# Patient Record
Sex: FEMALE | Race: WHITE
[De-identification: names, ages, dates, MRNs, and addresses within clinical notes are randomized per-mention and may not be internally consistent; named-entity substitution may affect disease eponyms.]

---

## 2017-04-26 ENCOUNTER — HOSPITAL ENCOUNTER (INPATIENT)
Dept: HOSPITAL 74 - JER | Age: 67
LOS: 8 days | Discharge: HOME | DRG: 603 | End: 2017-05-04
Payer: COMMERCIAL

## 2017-04-26 VITALS — BODY MASS INDEX: 25.2 KG/M2

## 2017-04-26 DIAGNOSIS — Z87.891: ICD-10-CM

## 2017-04-26 DIAGNOSIS — I10: ICD-10-CM

## 2017-04-26 DIAGNOSIS — R50.9: ICD-10-CM

## 2017-04-26 DIAGNOSIS — I99.8: ICD-10-CM

## 2017-04-26 DIAGNOSIS — E78.5: ICD-10-CM

## 2017-04-26 DIAGNOSIS — L03.032: Primary | ICD-10-CM

## 2017-04-26 DIAGNOSIS — E03.9: ICD-10-CM

## 2017-04-26 DIAGNOSIS — D47.3: ICD-10-CM

## 2017-04-26 LAB
ALBUMIN SERPL-MCNC: 4 G/DL (ref 3.4–5)
ALP SERPL-CCNC: 99 U/L (ref 45–117)
ALT SERPL-CCNC: 22 U/L (ref 12–78)
ANION GAP SERPL CALC-SCNC: 9 MMOL/L (ref 8–16)
APTT BLD: 30.9 SECONDS (ref 26.9–34.4)
AST SERPL-CCNC: 20 U/L (ref 15–37)
BASOPHILS # BLD: 0.9 % (ref 0–2)
BILIRUB SERPL-MCNC: 0.6 MG/DL (ref 0.2–1)
CALCIUM SERPL-MCNC: 9.6 MG/DL (ref 8.5–10.1)
CO2 SERPL-SCNC: 26 MMOL/L (ref 21–32)
CREAT SERPL-MCNC: 0.7 MG/DL (ref 0.55–1.02)
DEPRECATED RDW RBC AUTO: 16.1 % (ref 11.6–15.6)
EOSINOPHIL # BLD: 3.5 % (ref 0–4.5)
GLUCOSE SERPL-MCNC: 106 MG/DL (ref 74–106)
INR BLD: 1.1 (ref 0.82–1.09)
MCH RBC QN AUTO: 26 PG (ref 25.7–33.7)
MCHC RBC AUTO-ENTMCNC: 32.8 G/DL (ref 32–36)
MCV RBC: 79.4 FL (ref 80–96)
NEUTROPHILS # BLD: 69.8 % (ref 42.8–82.8)
PLATELET # BLD AUTO: 873 K/MM3 (ref 134–434)
PMV BLD: 8.8 FL (ref 7.5–11.1)
PROT SERPL-MCNC: 6.8 G/DL (ref 6.4–8.2)
PT PNL PPP: 12.1 SEC (ref 9.98–11.88)
WBC # BLD AUTO: 16 K/MM3 (ref 4–10)

## 2017-04-26 RX ADMIN — CEFAZOLIN SCH MLS/HR: 1 INJECTION, POWDER, FOR SOLUTION INTRAVENOUS at 20:50

## 2017-04-26 RX ADMIN — ROSUVASTATIN CALCIUM SCH MG: 10 TABLET, FILM COATED ORAL at 21:31

## 2017-04-26 RX ADMIN — SODIUM CHLORIDE SCH MLS/HR: 9 INJECTION, SOLUTION INTRAVENOUS at 17:36

## 2017-04-26 RX ADMIN — HYDROXYUREA SCH MG: 500 CAPSULE ORAL at 21:31

## 2017-04-26 NOTE — CONSULT
Consult





- Alcohol/Substance Use


Hx Alcohol Use: No





- Smoking History


Smoking history: Former smoker


Have you smoked in the past 12 months: No


If you are a former smoker, when did you quit?: 





Home Medications





- Allergies


Allergies/Adverse Reactions: 


 Allergies











Allergy/AdvReac Type Severity Reaction Status Date / Time


 


No Known Allergies Allergy   Verified 17 09:33














- Home Medications


Home Medications: 


Ambulatory Orders





Levothyroxine [Synthroid -] 88 mcg PO DAILY 04/13/15 


Rosuvastatin Calcium [Crestor] 20 mg PO HS 04/13/15 


Tramadol HCl [Ultram -] 50 mg PO PRN PRN 04/13/15 


Aspirin [ASA -] 81 mg PO DAILY 17 


Hydroxyurea [Hydrea] 500 mg PO BID 17 


Nebivolol [Bystolic -] 5 mg PO DAILY 17 


Omeprazole 40 mg PO DAILY 17 











Physical Exam


Vital Signs: 


 Vital Signs











Temperature  98.6 F   17 14:12


 


Pulse Rate  69   17 14:12


 


Respiratory Rate  18   17 14:12


 


Blood Pressure  151/60   17 14:12


 


O2 Sat by Pulse Oximetry (%)  100   17 14:07














Assessment/Plan


Vascular Surgery 





The patient is a 66 year old female, with a significant past medical history of 

hypertension, hyperlipidemia,hypothyroidism, goiter s/p surgery, lower back pain

, migraines, Raynauds syndrome, and possible thrombocythemia, who presents to 

the emergency department sent by her PCP Dr. Sutherland, for evaluation of blue 

left pinky toe for approximately 5 days.The patient reports she has been 

experiencing pain to her left pinky toe for approximately 2 weeks. At first she 

believed the pain was associated with her shoes. As a result she reports using 

wider low heel shoes. She denies any trauma to the left pinky toe. Patient 

reports walking around on Saturday, which exacerbated her symptoms. After 

returning home, she reports experiencing left pinky toe swelling and sharp 

pinching sensations. Patient states she woke up in the middle of the night 

secondary to pain. Patient reports associated chills and sweats on . 

Patient states she visited her PCP earlier this week for evaluation of her toe 

pain, who reported she had poor circulation and placed her on hydroxyurea BID 

and 81 mg aspirin once a day. Patient reports subjective fever, productive cough

, and dizziness, but denies any abdominal pain, nausea, or vomiting. The 

patient denies any chest pain, shortness of breath, or palpitations. Patient 

reports cold-like symptoms approximately 1 week ago. 








Allergies: None reported.


Past Surgical History: Thyroidectomy, Fibroidectomy, Ectopic pregnancy, 

 section


Social History: Former smoker(Quit ). Denies alcohol or drug use. 


PCP: Dr. Koko SOLO


Head - NC/AT


Lung - CTA


Heart - RRR


abd - soft,nt,nd


ext - Left foot warm,pink, 


Palpable DP and PT pulse. 


Left fifth toe discolored. 





A/P 


Left fifth toe discoloration for 5 dAYS. Pt does not comment on any trauma. 


Pt has palpable pulses. No need for any vascular intervention at this time. 


Could this be raynauds?


Can treat with antibiotics. 


Will have to monitor toe to see how it progresses. 


Toe discoloration with pain could also be due to embolization from the aorta. 


Will follow





Ben Bishop DO

## 2017-04-26 NOTE — PDOC
History of Present Illness





<Wm Watson - Last Filed: 17 11:22>





- General


History Source: Patient


Exam Limitations: No Limitations





- History of Present Illness


Initial Comments: 


17 10:36


The patient is a 66 year old female, with a significant past medical history of 

hypertension, hyperlipidemia,hypothyroidism, goiter s/p surgery, lower back pain

, migraines, Raynauds syndrome, and possible thrombocythemia, who presents to 

the emergency department sent by her PCP Dr. Sutherland, for evaluation of blue 

left pinky toe for approximately 5 days.The patient reports she has been 

experiencing pain to her left pinky toe for approximately 2 weeks. At first she 

believed the pain was associated with her shoes. As a result she reports using 

wider low heel shoes. She denies any trauma to the left pinky toe. Patient 

reports walking around on Saturday, which exacerbated her symptoms. After 

returning home, she reports experiencing left pinky toe swelling and sharp 

pinching sensations. Patient states she woke up in the middle of the night 

secondary to pain. Patient reports associated chills and sweats on . 

Patient states she visited her PCP earlier this week for evaluation of her toe 

pain, who reported she had poor circulation and placed her on hydroxyurea BID 

and 81 mg aspirin once a day. Patient reports subjective fever, productive cough

, and dizziness, but denies any abdominal pain, nausea, or vomiting. The 

patient denies any chest pain, shortness of breath, or palpitations. Patient 

reports cold-like symptoms approximately 1 week ago. 








Allergies: None reported.


Past Surgical History: Thyroidectomy, Fibroidectomy, Ectopic pregnancy, 

 section


Social History: Former smoker(Quit ). Denies alcohol or drug use. 


PCP: Dr. Sutherland











<Vicenta Harrington - Last Filed: 17 15:46>





- General


Chief Complaint: Pain, Acute


Stated Complaint: PCP SENT


Time Seen by Provider: 17 09:38





Past History





- Past Medical History


Anemia:  (THROMBOCYTOPENIA)


Asthma: No


Cancer: No


Cardiac Disorders: No


CVA: No


COPD: No


CHF: No


Dementia: No


Diabetes: No


GI Disorders: Yes (H/O COLON POLYPS)


 Disorders: No


HTN: Yes


Hypercholesterolemia: Yes


Liver Disease: No


Seizures: No


Thyroid Disease: Yes


Other medical history: RAYNAUDS SYNDROME,MIGRAINES





- Surgical History


Abdominal Surgery: No


Appendectomy: No


Cardiac Surgery: No


Cholecystectomy: No


Lung Surgery: No


Neurologic Surgery: No


Orthopedic Surgery: No





- Psycho/Social/Smoking Cessation Hx


Suicidal Ideation: No


Smoking History: Former smoker


Have you smoked in the past 12 months: No


If you are a former smoker, when did you quit?: 


Information on smoking cessation initiated: No


Hx Alcohol Use: No


Drug/Substance Use Hx: No


Substance Use Type: None


Hx Substance Use Treatment: No





<Wm Watson - Last Filed: 17 11:22>





<Vicenta Harrington - Last Filed: 17 15:46>





- Past Medical History


Allergies/Adverse Reactions: 


 Allergies











Allergy/AdvReac Type Severity Reaction Status Date / Time


 


No Known Allergies Allergy   Verified 17 09:33











Home Medications: 


Ambulatory Orders





Levothyroxine [Synthroid -] 88 mcg PO DAILY 04/13/15 


Rosuvastatin Calcium [Crestor] 20 mg PO HS 04/13/15 


Tramadol HCl [Ultram -] 50 mg PO PRN PRN 04/13/15 


Aspirin [ASA -] 81 mg PO DAILY 17 


Hydroxyurea [Hydrea] 500 mg PO BID 17 


Nebivolol [Bystolic -] 5 mg PO DAILY 17 


Omeprazole 40 mg PO DAILY 17 











**Review of Systems





- Review of Systems


Able to Perform ROS?: Yes


Comments:: 





17 10:37


CONSTITUTIONAL:


Present: +Fever, +Chills, +Diaphoresis


Absent: Generalized Weakness, Malaise, Loss of Appetite


HEENT:


Present: +Nasal Congestion


Absent: Throat Pain, Throat Swelling, Difficulty Swallowing, Mouth Swelling, 

Ear Pain, Eye Pain, Visual Changes


CARDIOVASCULAR:


Absent: Chest Pain, Syncope, Palpitations, Irregular Heart Rate, Lightheadedness

, Peripheral Edema


RESPIRATORY:


Present: +Cough


Absent: Shortness of Breath, SOB with Exertion, Orthopnea, Wheezing, Stridor, 

Hemoptysis


GASTROINTESTINAL:


Absent: Abdominal pain, Abdominal Distension, Nausea, Vomiting, Diarrhea, 

Constipation, Melena, Hematochezia


GENITOURINARY:


Absent: Dysuria, Frequency, Urgency, Hesitancy, Flank Pain, Genital Pain


MUSCULOSKELETAL:


Present: +Swelling/ Erythema/ Blue Left pinky toe


Absent: Myalgia, Back pain, Neck Pain


SKIN:


Absent: Rash, Itching, Pallor


HEMEATOLOGIC/IMMUNOLOGIC:


Present: +poor circulation


Absent: Easy Bleeding, Easy Bruising, Lymphadenopathy, Frequent infections


ENDOCRINE:


Absent: Unexplained Weight Gain, Unexplained Weight Loss, Heat Intolerance, 

Cold Intolerance


NEUROLOGIC:


Present: +Dizziness


Absent: Headache, Focal Weakness, Vertigo, Unsteady Gait, Seizure, Mental 

Status Changes, Incontinence


PSYCHIATRIC:


Absent: Anxiety, Depression








<Vicenta Harrington - Last Filed: 17 15:46>





*Physical Exam





- Vital Signs


 Last Vital Signs











Temp Pulse Resp BP Pulse Ox


 


 98.6 F   71   16   148/72   99 


 


 17 09:28  17 09:28  17 09:28  17 09:28  17 09:28














<Wm Watson - Last Filed: 17 11:22>





- Vital Signs


 Last Vital Signs











Temp Pulse Resp BP Pulse Ox


 


 98.6 F   71   16   148/72   99 


 


 17 09:28  17 09:28  17 09:28  17 09:28  17 09:28














- Physical Exam


Comments: 


17 10:37





GENERAL: The patient is awake, alert, and fully oriented, in no acute distress.


HEAD: Normal with no signs of trauma.


EYES: Pupils equal, round and reactive to light, extraocular movements intact, 

sclera anicteric, conjunctiva clear.


ENT: Ears normal, nares patent, oropharynx clear without exudates. Moist mucous 

membranes.


NECK: Normal range of motion, supple without lymphadenopathy, JVD, or masses.


LUNGS: Breath sounds equal, clear to auscultation bilaterally. No wheezes, and 

no crackles.


HEART: Regular rate and rhythm, normal S1 and S2 without murmur, rub or gallop.


ABDOMEN: Soft, nontender, normoactive bowel sounds. No guarding, no rebound. No 

masses.


EXTREMITIES: 


Blue distally and erythematous proximal joint to the left 5th toe. Capillary 

refill 15 seconds in the left 5th toe. Toes are cold. Pain with dorsiflexion to 

the left 5th toe. Skin intact. Nail bed intact. Normal range of motion. No 

clubbing or cyanosis. No cords.


NEUROLOGICAL: Cranial nerves II through XII grossly intact. Normal speech, 

normal gait.


PSYCH: Normal mood, normal affect.


SKIN: Warm, Dry, normal turgor, no rashes or lesions noted.


CIRCULATION: Left leg femoral pulse 2+, DP 2+, PT 2+. Cap refill of toes 1-4 3 

seconds, 5th toe 15 seconds.








<Vicenta Harrington - Last Filed: 17 15:46>





**Heart Score/ECG Review





- ECG Intrepretation


Comment:: 





17 10:30


Twelve-lead EKG was reviewed by me.  The rhythm is normal sinus rhythm at a 

rate of 68 bpm.  The axis is normal.  The intervals are normal.  There is 

nonspecific T-wave flattening in the lateral leads.  Old EKG for comparison is 

pending at this time.





<Wm Watson - Last Filed: 17 11:22>





ED Treatment Course





- LABORATORY


CBC & Chemistry Diagram: 


 17 10:05





 17 10:05





- RADIOLOGY


Radiology Studies Ordered: 














 Category Date Time Status


 


 CHEST PA & LAT [RAD] [facility]  17 10:02 Ordered


 


 TOE(S) LEFT [RAD] Stat Radiology  17 10:14 Ordered














<Wm Watson - Last Filed: 17 11:22>





- LABORATORY


CBC & Chemistry Diagram: 


 17 10:05





 17 10:05





- RADIOLOGY


Radiograph Interpretation: 





17 15:43


EXAM: Toe X-Ray


INTERPRETED BY: Dr. Lackey


REVIEWED BY: Dr. Watson


IMPRESSION: Soft tissue changes are noted as above. No radiographic evidence of 

osteomyelitis is seen. MRI is more sensitive for detection of osteomyelitis. 





EXAM: CXR


INTERPRETED BY: Dr. Romeo


REVIEWED BY: Dr. Watson


IMPRESSION: No acute pathology. No significant change.








<Vicenta Harrington - Last Filed: 17 15:46>





Medical Decision Making





- Medical Decision Making





17 11:22


66-year-old female with a history of hypertension and hyperlipidemia, former 

smoker quit many years ago, presents with pain and discoloration in her left 

fifth toe for more than a week.  On examination, there is purplish 

discoloration and delayed capillary refill, up to 15 seconds capillary refill, 

but normal dorsalis pedis and posterior tibial pulses.  X-ray of the left fifth 

toe shows no fracture or dislocation, no bony changes.





Impression: 





Ischemic left fifth toe.  No atrial fibrillation to suggest embolic disease.  

No history of diabetes mellitus.  





Patient also has a history of thrombocytosis.  She was recently started on 

hydroxyurea and baby aspirin.  





Patient will be admitted for further evaluation of her ischemic toe.  There is 

no evidence of open wound to the area.  White blood cell count is elevated, but 

patient is afebrile.





 Laboratory Tests











  17





  10:05


 


WBC  16.0 H D


 


RBC  5.33 H


 


Hgb  13.9


 


Hct  42.3


 


MCV  79.4 L


 


MCHC  32.8


 


RDW  16.1 H


 


Plt Count  873 H D


 


MPV  8.8


 


Neutrophils %  69.8


 


Lymphocytes %  18.1  D


 


Monocytes %  7.7


 


Eosinophils %  3.5


 


Basophils %  0.9








Other lab results are pending at this time.





<Wm Watson - Last Filed: 17 11:22>





- Medical Decision Making


17 11:41


First call placed to Dr. Sutherland at 11:41. Case discussed at this time. Agreed 

to admit.


Will put in a consult to Dr. Bishop.





<Vicenta Harrington - Last Filed: 17 15:46>





*DC/Admit/Observation/Transfer





- Discharge Dispostion


Admit: Yes


Decision to Admit order Date/Time: 





17 11:26








<Wm Watson - Last Filed: 17 11:22>





- Attestations


Scribe Attestion: 





17 10:37





Documentation prepared by Vicenta Harrington, acting as medical scribe for Wm Watson MD.





<Vicenta Harrington - Last Filed: 17 15:46>


Diagnosis at time of Disposition: 


 Ischemic toe, Thrombocytosis





- Discharge Dispostion


Condition at time of disposition: Stable





- Referrals

## 2017-04-27 LAB
ALBUMIN SERPL-MCNC: 3.3 G/DL (ref 3.4–5)
ALP SERPL-CCNC: 88 U/L (ref 45–117)
ALT SERPL-CCNC: 18 U/L (ref 12–78)
ANION GAP SERPL CALC-SCNC: 7 MMOL/L (ref 8–16)
AST SERPL-CCNC: 17 U/L (ref 15–37)
BASOPHILS # BLD: 1.5 % (ref 0–2)
BILIRUB DIRECT SERPL-MCNC: 207 U/L (ref 84–246)
BILIRUB SERPL-MCNC: 0.7 MG/DL (ref 0.2–1)
CALCIUM SERPL-MCNC: 8.9 MG/DL (ref 8.5–10.1)
CO2 SERPL-SCNC: 28 MMOL/L (ref 21–32)
COCKROFT - GAULT: 74.84
CREAT SERPL-MCNC: 0.8 MG/DL (ref 0.55–1.02)
CRP SERPL-MCNC: 1.4 MG/DL (ref 0–0.3)
DEPRECATED RDW RBC AUTO: 15.5 % (ref 11.6–15.6)
EOSINOPHIL # BLD: 4.5 % (ref 0–4.5)
FERRITIN SERPL-MCNC: 51.41 NG/ML (ref 6.9–282.5)
GLUCOSE SERPL-MCNC: 109 MG/DL (ref 74–106)
MCH RBC QN AUTO: 25.9 PG (ref 25.7–33.7)
MCHC RBC AUTO-ENTMCNC: 32.9 G/DL (ref 32–36)
MCV RBC: 78.7 FL (ref 80–96)
NEUTROPHILS # BLD: 69.5 % (ref 42.8–82.8)
PLATELET # BLD AUTO: 859 K/MM3 (ref 134–434)
PMV BLD: 8.6 FL (ref 7.5–11.1)
PROT SERPL-MCNC: 6 G/DL (ref 6.4–8.2)
URATE SERPL-SCNC: 5.7 MG/DL (ref 2.6–7.2)
WBC # BLD AUTO: 16.2 K/MM3 (ref 4–10)

## 2017-04-27 RX ADMIN — CEFAZOLIN SCH MLS/HR: 1 INJECTION, POWDER, FOR SOLUTION INTRAVENOUS at 10:15

## 2017-04-27 RX ADMIN — CEFAZOLIN SCH MLS/HR: 1 INJECTION, POWDER, FOR SOLUTION INTRAVENOUS at 01:25

## 2017-04-27 RX ADMIN — ROSUVASTATIN CALCIUM SCH MG: 10 TABLET, FILM COATED ORAL at 21:39

## 2017-04-27 RX ADMIN — NYSTATIN AND TRIAMCINOLONE ACETONIDE SCH APPLIC: 100000; 1 OINTMENT TOPICAL at 13:52

## 2017-04-27 RX ADMIN — PANTOPRAZOLE SODIUM SCH MG: 40 TABLET, DELAYED RELEASE ORAL at 10:15

## 2017-04-27 RX ADMIN — LEVOTHYROXINE SODIUM SCH MCG: 88 TABLET ORAL at 07:26

## 2017-04-27 RX ADMIN — SODIUM CHLORIDE SCH MLS/HR: 9 INJECTION, SOLUTION INTRAVENOUS at 13:34

## 2017-04-27 RX ADMIN — NYSTATIN AND TRIAMCINOLONE ACETONIDE SCH APPLIC: 100000; 1 OINTMENT TOPICAL at 21:39

## 2017-04-27 RX ADMIN — NEBIVOLOL HYDROCHLORIDE SCH MG: 5 TABLET ORAL at 10:15

## 2017-04-27 RX ADMIN — HYDROXYUREA SCH MG: 500 CAPSULE ORAL at 10:15

## 2017-04-27 RX ADMIN — ASPIRIN 81 MG SCH MG: 81 TABLET ORAL at 10:15

## 2017-04-27 RX ADMIN — CEFAZOLIN SCH MLS/HR: 1 INJECTION, POWDER, FOR SOLUTION INTRAVENOUS at 18:29

## 2017-04-27 RX ADMIN — HYDROXYUREA SCH MG: 500 CAPSULE ORAL at 21:38

## 2017-04-27 RX ADMIN — SODIUM CHLORIDE SCH MLS/HR: 9 INJECTION, SOLUTION INTRAVENOUS at 04:28

## 2017-04-27 NOTE — HP
Admitting History and Physical





- Primary Care Physician


PCP: Pranav Horner





- Admission


Chief Complaint: left small toe discoloration


History of Present Illness: 


ER HISTORY 


- History of Present Illness


Initial Comments: 


04/26/17 10:36


The patient is a 66 year old female, with a significant past medical history of 

hypertension, hyperlipidemia,hypothyroidism, goiter s/p surgery, lower back pain

, migraines, Raynauds syndrome, and possible thrombocythemia, who presents to 

the emergency department sent by her PCP Dr. Horner, for evaluation of blue 

left pinky toe for approximately 5 days.The patient reports she has been 

experiencing pain to her left pinky toe for approximately 2 weeks. At first she 

believed the pain was associated with her shoes. As a result she reports using 

wider low heel shoes. She denies any trauma to the left pinky toe. Patient 

reports walking around on Saturday, which exacerbated her symptoms. After 

returning home, she reports experiencing left pinky toe swelling and sharp 

pinching sensations. Patient states she woke up in the middle of the night 

secondary to pain. Patient reports associated chills and sweats on Sunday. 

Patient states she visited her PCP earlier this week for evaluation of her toe 

pain, who reported she had poor circulation and placed her on hydroxyurea BID 

and 81 mg aspirin once a day. Patient reports subjective fever, productive cough

, and dizziness, but denies any abdominal pain, nausea, or vomiting. The 

patient denies any chest pain, shortness of breath, or palpitations. Patient 

reports cold-like symptoms approximately 1 week ago. 








Pt examined by me in the floors


Family at bedside


Has h/o Thrombocythemia-- seen by Dr horner on 4/25 for blue left 5 th toe and 

was advised t go to ER at that time, but refused to go. 


She states that the bluish color has gotten better. Has pain at underside of 

toe. Able to move toes. 


She c/o dysphagia for many years now- she feels food stuck in her chest about 

20 min after eating food. She had EGD done before which was normal per pt. 


She is able to ambulate without any difficulty 








History Source: Patient


Limitations to Obtaining History: No Limitations





- Past Medical History


Cardiovascular: Yes: HTN, Hyperlipdemia


Heme/Onc: Yes: Other (thrombocythemia)


Rheumatology: Yes: Other (Reynaud's disease)


Endocrine: Yes: Hypothyroidism





- Smoking History


Smoking history: Former smoker


Have you smoked in the past 12 months: No


If you are a former smoker, when did you quit?: 1990





- Alcohol/Substance Use


Hx Alcohol Use: No





Home Medications





- Allergies


Allergies/Adverse Reactions: 


 Allergies











Allergy/AdvReac Type Severity Reaction Status Date / Time


 


No Known Allergies Allergy   Verified 04/26/17 09:33














- Home Medications


Home Medications: 


Ambulatory Orders





Levothyroxine [Synthroid -] 88 mcg PO DAILY 04/13/15 


Rosuvastatin Calcium [Crestor] 20 mg PO HS 04/13/15 


Tramadol HCl [Ultram -] 50 mg PO PRN PRN 04/13/15 


Aspirin [ASA -] 81 mg PO DAILY 04/26/17 


Hydroxyurea [Hydrea] 500 mg PO BID 04/26/17 


Nebivolol [Bystolic -] 5 mg PO DAILY 04/26/17 


Omeprazole 40 mg PO DAILY 04/26/17 











Review of Systems





- Review of Systems


Constitutional: denies: Chills, Fever, Loss of Appetite, Malaise, Unintentional 

Wgt. Loss, Weakness


Musculoskeletal: reports: Other (toe pain)





Physical Examination


Vital Signs: 


 Vital Signs











Temperature  99 F   04/27/17 06:00


 


Pulse Rate  60   04/27/17 06:00


 


Respiratory Rate  20   04/27/17 06:00


 


Blood Pressure  126/64   04/27/17 06:00


 


O2 Sat by Pulse Oximetry (%)  100   04/26/17 21:00











Constitutional: Yes: No Distress, Calm


Cardiovascular: Yes: Regular Rate and Rhythm


Respiratory: Yes: CTA Bilaterally


Gastrointestinal: Yes: Normal Bowel Sounds, Soft.  No: Distention, Tenderness


Extremities: Yes: Other (left 5th toe-- slight bluish in color, pulses palpable

, foot is warm, intertrigo)


Edema: No


Psychiatric: Yes: Alert, Oriented


Labs: 


 Laboratory Last Values











WBC  16.2 K/mm3 (4.0-10.0)  H  04/27/17  10:30    


 


RBC  4.92 M/mm3 (3.60-5.2)   04/27/17  10:30    


 


Hgb  12.7 GM/dL (10.7-15.3)   04/27/17  10:30    


 


Hct  38.7 % (32.4-45.2)   04/27/17  10:30    


 


MCV  78.7 fl (80-96)  L  04/27/17  10:30    


 


MCHC  32.9 g/dl (32.0-36.0)   04/27/17  10:30    


 


RDW  15.5 % (11.6-15.6)   04/27/17  10:30    


 


Plt Count  859 K/MM3 (134-434)  H  04/27/17  10:30    


 


MPV  8.6 fl (7.5-11.1)   04/27/17  10:30    


 


Neutrophils %  69.5 % (42.8-82.8)   04/27/17  10:30    


 


Lymphocytes %  18.3 % (8-40)   04/27/17  10:30    


 


Monocytes %  6.2 % (3.8-10.2)   04/27/17  10:30    


 


Eosinophils %  4.5 % (0-4.5)   04/27/17  10:30    


 


Basophils %  1.5 % (0-2.0)   04/27/17  10:30    


 


ESR  10 mm/hr (0-30)   04/27/17  10:32    


 


INR  1.10  (0.82-1.09)   04/26/17  10:05    


 


PTT (Actin FS)  30.9 SECONDS (26.9-34.4)   04/26/17  10:05    


 


Sodium  146 mmol/L (136-145)  H  04/27/17  10:30    


 


Potassium  4.5 mmol/L (3.5-5.1)   04/27/17  10:30    


 


Chloride  111 mmol/L ()  H  04/27/17  10:30    


 


Carbon Dioxide  28 mmol/L (21-32)   04/27/17  10:30    


 


Anion Gap  7  (8-16)  L  04/27/17  10:30    


 


BUN  13 mg/dL (7-18)   04/27/17  10:30    


 


Creatinine  0.8 mg/dL (0.55-1.02)   04/27/17  10:30    


 


Creat Clearance w eGFR  > 60  (>60)   04/27/17  10:30    


 


Random Glucose  109 mg/dL ()  H  04/27/17  10:30    


 


Lactic Acid  1.056 mmol/L (0.4-2.0)   04/26/17  10:50    


 


Uric Acid  5.7 mg/dL (2.6-7.2)   04/27/17  10:30    


 


Calcium  8.9 mg/dL (8.5-10.1)   04/27/17  10:30    


 


Ferritin  51.415 ng/ml (6.9-282.5)   04/27/17  10:30    


 


Total Bilirubin  0.7 mg/dL (0.2-1.0)   04/27/17  10:30    


 


AST  17 U/L (15-37)   04/27/17  10:30    


 


ALT  18 U/L (12-78)   04/27/17  10:30    


 


Alkaline Phosphatase  88 U/L ()   04/27/17  10:30    


 


LD Total  207 U/L ()   04/27/17  10:30    


 


C-Reactive Protein  1.4 MG/DL (0.00-0.3)  H  04/27/17  10:30    


 


Total Protein  6.0 g/dl (6.4-8.2)  L  04/27/17  10:30    


 


Albumin  3.3 g/dl (3.4-5.0)  L  04/27/17  10:30    


 


Blood Type  O POSITIVE   04/26/17  10:05    


 


Antibody Screen  Negative   04/26/17  10:05    














Imaging





- Results


Chest X-ray: Image Reviewed (clear)


X-ray: Report Reviewed (toe xray-- soft tissue swelling)


EKG: Image Reviewed (sinus)





Problem List





- Problems


(1) Ischemic toe


Assessment/Plan: 


unlikely ischemic isael as pt has warm extremities and good peripheral pulses


Seen by Vascular 


Code(s): I99.8 - OTHER DISORDER OF CIRCULATORY SYSTEM





(2) Thrombocytosis


Assessment/Plan: 


Hematology evaluation


On ASA and Hydroxyurea 


Code(s): D47.3 - ESSENTIAL (HEMORRHAGIC) THROMBOCYTHEMIA





(3) Cellulitis


Assessment/Plan: 


iv antibiotics 


Code(s): L03.90 - CELLULITIS, UNSPECIFIED   Qualifiers: 


     Site of cellulitis of extremity: toe     Laterality: left





(4) HTN (hypertension)


Assessment/Plan: 


on meds


monitor BP 


Code(s): I10 - ESSENTIAL (PRIMARY) HYPERTENSION   Qualifiers: 


     Hypertension type: essential hypertension        Qualified Code(s): I10 - 

Essential (primary) hypertension

## 2017-04-27 NOTE — CONSULT
Consult - text type





- Consultation


Consultation Note: 


Patient seen and examined





The patient is a 66 year old female, with a significant past medical history of 

hypertension, hyperlipidemia,hypothyroidism, goiter s/p surgery, lower back pain

, migraines, Raynauds syndrome, and possible thrombocythemia, who presents  

for evaluation of blue left pinky toe for approximately 2 weeks.The patient 

reports she has been experiencing pain to her left pinky toe for approximately 

2 weeks. At first she believed the pain was associated with her shoes. As a 

result she reports using wider low heel shoes. She denies any trauma to the 

left pinky toe. It has been worse for last 5 days. Patient states she woke up 

in the middle of the night secondary to pain. Patient reports associated chills 

and sweats on . Patient states she visited her PCP earlier this week for 

evaluation of her toe pain, who reported she had poor circulation and placed 

her on hydroxyurea BID and 81 mg aspirin once a day.  The patient denies any 

chest pain, shortness of breath, or palpitations. Patient reports cold-like 

symptoms approximately 1 week ago. 


she denies 











- Past Medical History


Cardiovascular: Yes: HTN, Hyperlipdemia


Heme/Onc: Yes: Other (thrombocythemia)


Rheumatology: Yes: Other (Reynaud's disease)


Endocrine: Yes: Hypothyroidism





PSH





myomectomy


fx repair


carpal tunnel repair


thyroid surgery





- Smoking History


Smoking history: Former smoker











 Home Medications 





- Allergies


Allergies/Adverse Reactions: 


 Allergies











Allergy/AdvReac Type Severity Reaction Status Date / Time


 


No Known Allergies Allergy   Verified 17 09:33














- Home Medications


Home Medications: 


Ambulatory Orders





Levothyroxine [Synthroid -] 88 mcg PO DAILY 04/13/15 


Rosuvastatin Calcium [Crestor] 20 mg PO HS 04/13/15 


Tramadol HCl [Ultram -] 50 mg PO PRN PRN 04/13/15 


Aspirin [ASA -] 81 mg PO DAILY 17 


Hydroxyurea [Hydrea] 500 mg PO BID 17 


Nebivolol [Bystolic -] 5 mg PO DAILY 17 


Omeprazole 40 mg PO DAILY 17 











 Review of Systems 





- Review of Systems


Constitutional: denies: Chills, Fever, Loss of Appetite, Malaise, Unintentional 

Wgt. Loss, Weakness


Musculoskeletal: reports: Other (toe pain)





 Physical Examination 


Vital Signs: 


 Vital Signs











Temperature  99 F   17 06:00


 


Pulse Rate  60   17 06:00


 


Respiratory Rate  20   17 06:00


 


Blood Pressure  126/64   17 06:00


 


O2 Sat by Pulse Oximetry (%)  100   17 21:00











Constitutional: Yes: No Distress, Calm


Cardiovascular: Yes: Regular Rate and Rhythm


Respiratory: Yes: CTA Bilaterally


Gastrointestinal: Yes: Normal Bowel Sounds, Soft.  No: Distention, Tenderness


Extremities: Yes: Other (left 5th toe-- slight bluish in color, pulses palpable

, foot is warm, intertrigo)


Edema: No


Psychiatric: Yes: Alert, Oriented


Labs: 


 Laboratory Last Values











WBC  16.2 K/mm3 (4.0-10.0)  H  17  10:30    


 


RBC  4.92 M/mm3 (3.60-5.2)   17  10:30    


 


Hgb  12.7 GM/dL (10.7-15.3)   17  10:30    


 


Hct  38.7 % (32.4-45.2)   17  10:30    


 


MCV  78.7 fl (80-96)  L  17  10:30    


 


MCHC  32.9 g/dl (32.0-36.0)   17  10:30    


 


RDW  15.5 % (11.6-15.6)   17  10:30    


 


Plt Count  859 K/MM3 (134-434)  H  17  10:30    


 


MPV  8.6 fl (7.5-11.1)   17  10:30    


 


Neutrophils %  69.5 % (42.8-82.8)   17  10:30    


 


Lymphocytes %  18.3 % (8-40)   17  10:30    


 


Monocytes %  6.2 % (3.8-10.2)   17  10:30    


 


Eosinophils %  4.5 % (0-4.5)   17  10:30    


 


Basophils %  1.5 % (0-2.0)   17  10:30    


 


ESR  10 mm/hr (0-30)   17  10:32    


 


INR  1.10  (0.82-1.09)   17  10:05    


 


PTT (Actin FS)  30.9 SECONDS (26.9-34.4)   17  10:05    


 


Sodium  146 mmol/L (136-145)  H  17  10:30    


 


Potassium  4.5 mmol/L (3.5-5.1)   17  10:30    


 


Chloride  111 mmol/L ()  H  17  10:30    


 


Carbon Dioxide  28 mmol/L (21-32)   17  10:30    


 


Anion Gap  7  (8-16)  L  17  10:30    


 


BUN  13 mg/dL (7-18)   17  10:30    


 


Creatinine  0.8 mg/dL (0.55-1.02)   17  10:30    


 


Creat Clearance w eGFR  > 60  (>60)   17  10:30    


 


Random Glucose  109 mg/dL ()  H  17  10:30    


 


Lactic Acid  1.056 mmol/L (0.4-2.0)   17  10:50    


 


Uric Acid  5.7 mg/dL (2.6-7.2)   17  10:30    


 


Calcium  8.9 mg/dL (8.5-10.1)   17  10:30    


 


Ferritin  51.415 ng/ml (6.9-282.5)   17  10:30    


 


Total Bilirubin  0.7 mg/dL (0.2-1.0)   17  10:30    


 


AST  17 U/L (15-37)   17  10:30    


 


ALT  18 U/L (12-78)   17  10:30    


 


Alkaline Phosphatase  88 U/L ()   17  10:30    


 


LD Total  207 U/L ()   17  10:30    


 


C-Reactive Protein  1.4 MG/DL (0.00-0.3)  H  17  10:30    


 


Total Protein  6.0 g/dl (6.4-8.2)  L  17  10:30    


 


Albumin  3.3 g/dl (3.4-5.0)  L  17  10:30    


 


Blood Type  O POSITIVE   17  10:05    


 


Antibody Screen  Negative   17  10:05    











A/P





65 y/o with h/o autoimmune disease, comes in with Lt. 5th toe bluish 

discoloration and pain for 2 weeks, worsening since 4 days. Increase pain on 

elevation and improvement on hanging the legs down.


Also platelets  WBC elevated.


No other signs and symptoms suggestive of reactive thrombocytosis


Suspect primary myeloproliferative disorder with micro vascular ischemia


Peripheral blood flow/JAK2/CALr/MPL sent


VWD w/u sent


Will await above studies





Agree with hydrea to control platelet counts


agree with asa 





discussed at length with patient and her family

## 2017-04-27 NOTE — CONSULT
Admitting History and Physical





- Primary Care Physician


PCP: Beata Lauren





- Admission


History of Present Illness: 


Pt "c/o dysphagia for many years now- she feels food stuck in her chest about 

20 min after eating food. She had EGD done before which was normal per pt"





Pt reports c/o dysphagia with solid, food sticking in chest, followed by 

inability to eat or drink, 3 times a month. Denies vomiting or expectorating a 

lot of saliva at the time. She needs to stand up and walk around to clear it.


She had an EGD a couple of years ago by Dr. Larson. She is now followed by Dr. Solorzano.She reports frequent heartburn, "even on oatmeal".


History Source: Patient, Family Member, Medical Record


Limitations to Obtaining History: No Limitations





- Past Medical History


Cardiovascular: Yes: HTN, Hyperlipdemia


Heme/Onc: Yes: Other (thrombocythemia)


Rheumatology: Yes: Other (Reynaud's disease)


Endocrine: Yes: Hypothyroidism





- Smoking History


Smoking history: Former smoker


Have you smoked in the past 12 months: No


If you are a former smoker, when did you quit?: 1990





- Alcohol/Substance Use


Hx Alcohol Use: No





History





- Admission


Reason For Visit: ISCHEMIA OF TOE





- Diagnostics


X-ray: Report Reviewed





- General


Mental Status: Alert and Oriented, Awake and Alert, Able to Follow Commands


Attention: Intact


Ability to Follow Directions: Excellent


Head/Neck Control: WFL





- Hearing


Hearing: Normal





Speech Evaluation





- Communication


Primary Language: ENGLISH


Communication: Yes: Within Normal Limits


Oral Expression Ability: Yes: No Impairment





- Speech Production


Able to Make Needs Known: Yes: WNL


Intelligibility: Yes: WNL





- Speech Characteristics


Voice Loudness: Normal


Voice Pitch: Yes: Normal


Voice Phonatory-based Quality: Yes: Normal


Speech Pattern: Normal


Speech Clarity: < 100%


Nasal Resonance: Normal


Rate of Speech: Intact





- Language/Auditory Comprehension


Follows: Yes: Complex Commands





- Language/Verbal Expression


Functional Communication Status: Yes: WNL





- Memory/Perception


Long term Memory: Yes: WNL


Short Term Memory: Yes: WNL





- Swallow Evaluation/Bedside Assessment


Current Nutritional Intake: Regular, Thin Liquids


Oral Secretions: Yes: WFL


Dentition: Yes: Adequate


Facial Symmetry at Rest: Symmetrical


Facial Symmetry on Retraction: Symmetrical


Facial Movement: Controlled


Against Resistance Opening: Normal


Against Resistance Closing: Normal


Pucker Lips: Normal


Smile: Normal


Lingual Movement: Normal, Symmetric


Lingual Speed of Movement: Normal


Lingual Movement Strgth Against Opposition: Normal


Lingual Movement Characteristics: Normal


Velopharyngeal Movement: Normal


Laryngeal Elevation: WFL


Laryngeal Movement: Able to Palpate


Bolus Size: WFL


Labial Seal: WFL


Chewing: WFL


Oral Prep Time: WFL


A-P Transit: WFL


Pocketing: None


Timing of Swallow: WFL


Coughing/Throat Clear: No


Change in Voice: No





Recommendations





- Speech Evaluation, Impression/Plan


Impression: Pt with c/o dysphagia on solids, frequent heartburn, using eating 

an "entire sandwich before drinking." Solids stick in chest, with improved 

passage while standing.  R/o esophageal dysphagia, r/o GERD/gastritis, less 

likely stricture/schatzki's ring.  Pt denies intake of caffiene, carbonation, 

citrus, ETOH,smoking and is upright after meals.





- Dysphagia Impressions/Plan


Swallowing Skills: Impaired


Dysphagia Impressions: Ongoing Evaluation


Recommendations: GI Consult (Dr. Solorzano), MBS w Esophagus (ordered by pmd), 

Other (Alternate solids with liquids.)





- Recommendations


Diet Consistency: Regular (soft, moist, extra gravy/condiments), Other (GERD 

precautions)


Medication Administration: Whole with water (denies difficulty with pills)


Liquids: Thin Liquids

## 2017-04-28 LAB
IRON SERPL-MCNC: 60 UG/DL (ref 27–139)
TIBC SERPL-MCNC: 295 UG/DL (ref 250–450)
UIBC SERPL-MCNC: 235 UG/DL (ref 118–369)

## 2017-04-28 RX ADMIN — LEVOTHYROXINE SODIUM SCH MCG: 88 TABLET ORAL at 06:40

## 2017-04-28 RX ADMIN — ROSUVASTATIN CALCIUM SCH MG: 10 TABLET, FILM COATED ORAL at 21:31

## 2017-04-28 RX ADMIN — ASPIRIN 81 MG SCH MG: 81 TABLET ORAL at 21:31

## 2017-04-28 RX ADMIN — CEFAZOLIN SCH MLS/HR: 1 INJECTION, POWDER, FOR SOLUTION INTRAVENOUS at 17:06

## 2017-04-28 RX ADMIN — SODIUM CHLORIDE SCH MLS/HR: 9 INJECTION, SOLUTION INTRAVENOUS at 10:04

## 2017-04-28 RX ADMIN — NYSTATIN AND TRIAMCINOLONE ACETONIDE SCH APPLIC: 100000; 1 OINTMENT TOPICAL at 21:31

## 2017-04-28 RX ADMIN — CEFAZOLIN SCH MLS/HR: 1 INJECTION, POWDER, FOR SOLUTION INTRAVENOUS at 09:36

## 2017-04-28 RX ADMIN — NYSTATIN AND TRIAMCINOLONE ACETONIDE SCH APPLIC: 100000; 1 OINTMENT TOPICAL at 10:04

## 2017-04-28 RX ADMIN — NEBIVOLOL HYDROCHLORIDE SCH MG: 5 TABLET ORAL at 09:35

## 2017-04-28 RX ADMIN — ASPIRIN 81 MG SCH MG: 81 TABLET ORAL at 09:34

## 2017-04-28 RX ADMIN — PANTOPRAZOLE SODIUM SCH MG: 40 TABLET, DELAYED RELEASE ORAL at 09:34

## 2017-04-28 RX ADMIN — HYDROXYUREA SCH MG: 500 CAPSULE ORAL at 21:31

## 2017-04-28 RX ADMIN — CEFAZOLIN SCH MLS/HR: 1 INJECTION, POWDER, FOR SOLUTION INTRAVENOUS at 02:52

## 2017-04-28 NOTE — PATH
Surgical Pathology Report



Patient Name:  GAL ROSE

Accession #:  [id number]

[facility]. Rec. #:  [id number]                                                        

   /Age/Gender:  1950 (Age: 66) / F

Account:  [id number]                                                          

             Location: [facility] MED/SURG

Taken:  2017

Received:  2017

Reported:  2017

Physicians:  Grecia Mitchell M.D.

  



Specimen(s) Received

 PERIPHERAL BLOOD 





Clinical History

None Provided







Final Diagnosis

PERIPHERAL BLOOD:

FLOW CYTOMETRY performed and interpreted [place] [facility] [place] [place]

([id number]) shows the following:

INTERPRETATION: No atypical flow cytometric findings.

PHENOTYPE: Lymphocytes include polyclonal B cells, NK cells and

immunophenotypically normal CD4+ and CD8+ T cells in normal proportions. No

evidence of a clonal lymphoid expansion. Granulocytes are immunophenotypically

mature.



Comment: Molecular studies are pending, and a report will follow.





***Electronically Signed***

Satya Hurtado M.D.



Addendum     

Reported: 2017



Addendum Diagnosis

JAK2 V617F MUTATION ANALYSIS BY PCR PERFORMED AND INTERPRETED AT [facility]

[place] ([id number]) SHOWED THE FOLLOWING:

     RESULTS:  BOTH MUTANT AND WILD-TYPE JAK2 SEQUENCES WERE DETECTED.

     INTERPRETATION:  POSITIVE FOR JAK2 V617F MUTATION.



Comment: The acquired somatic V617F JAK2 mutation has been found in a majority

of patients with Polycythemia Vera (PV)(>90%), and 30-50% of patients with

either essential thrombocythemia (ET) and or myelofibrosis (MF) according to

literature. The mutation is not detected in normal individuals. 





 Alexander Finkelstein, M.D.  

Addendum     

Reported: 2017



Addendum Diagnosis

Molecular Pathology Report received from [facility] in [place], [place] ([id number]) shows

the following:



BCR/ABL GENE REARRANGEMENT (IS) ANALYSIS

RESULTS: NEGATIVE

BCR/ABL Major Breakpoints (b2a2 and b3a2): NOT DETECTED 

BCR/ABL Minor Breakpoint (e1a2): NOT DETECTED 

INTERPRETATION: No BCR-ABL translocation was detected in this sample.







 Satya Hurtado M.D.  

 



Gross Description

Received are 2 green top tubes and 2 lavender top tubes of blood which are sent

to [facility].

DL/2017



saudi/2017

## 2017-04-28 NOTE — PN
Progress Note (short form)





- Note


Progress Note: 


Patient seen and examined


Discussed with Dr. Dyer





Complains of pain in pinky on dorsiflexion of foot


 Last Vital Signs











Temp Pulse Resp BP Pulse Ox


 


 98.5 F   62   20   140/72   100 


 


 04/28/17 07:52  04/28/17 07:52  04/28/17 07:52  04/28/17 07:52  04/27/17 21:00











HEENT: GORAN, EOM Intact, Large Pupils 


Oropharynx: No thrush, No mucositis


Neck: Supple


Nodes: Without adenopathy


Breasts: Without masses


Cor: RSR, No murmurs, No gallops


Lungs: Clear to P&A


Abd: Soft, Normal bowel sounds, No organomegaly


Ext: somewhat bluish pinky  with good pulses on left


Skin: No rashes, Integument intact


 CBC, BMP





 04/27/17 10:30 





 04/27/17 10:30 





 Current Medications











Generic Name Dose Route Start Last Admin





  Trade Name Freq  PRN Reason Stop Dose Admin


 


Aspirin  81 mg 04/28/17 22:00  





  Asa -  PO   





  BID NASRIN   


 


Hydroxyurea  500 mg 04/28/17 10:00  





  Hydrea -  PO   





  DAILY NASRIN   


 


Hydroxyurea  1,000 mg 04/27/17 22:00 04/27/17 21:38





  Hydrea -  PO   1,000 mg





  HS NASRIN   Administration


 


Sodium Chloride  1,000 mls @ 125 mls/hr 04/26/17 11:45 04/27/17 13:34





  Normal Saline -  IV   125 mls/hr





  ASDIR NASRIN   Administration


 


Cefazolin Sodium  50 mls @ 100 mls/hr 04/26/17 20:00 04/28/17 09:36





  Ancef 1gm Ivpb (Pre-Docked)  IVPB   100 mls/hr





  Q8H-IV NASRIN   Administration


 


Levothyroxine Sodium  88 mcg 04/27/17 07:00 04/28/17 06:40





  Synthroid -  PO   88 mcg





  DAILY@0700 NASRIN   Administration


 


Nebivolol  5 mg 04/27/17 10:00 04/28/17 09:35





  Bystolic -  PO   5 mg





  DAILY NASRIN   Administration


 


Nystatin/Triamcinolone Acetonide  1 applic 04/27/17 12:15 04/27/17 21:39





  Mycolog Ii Ointment -  TP   1 applic





  BID NASRIN   Administration


 


Pantoprazole Sodium  40 mg 04/27/17 10:00 04/28/17 09:34





  Protonix -  PO   40 mg





  DAILY NASRIN   Administration


 


Rosuvastatin Calcium  20 mg 04/26/17 22:00 04/27/17 21:39





  Crestor -  PO   20 mg





  HS NASRIN   Administration


 


Tramadol HCl  50 mg 04/26/17 19:54 04/26/17 22:44





  Ultram -  PO   50 mg





  Q6H PRN   Administration





  HEADACHE   








Impression:





Likely E.T


 TAMMY-2  CALRETICULIN, mpl AND Tammy-12,13, MUTATIONS PENDING. iF POSITIVE , THIS 

WOULD CONFIRM DIAGNOSIS. 


CURRENTLY ON HYDREA. 


WHEN PLATELETS < 450K,  LESS BLEEDING RISK TO PROCEED WITH BONE MARROW IF 

NEEDED. 


ASA-81 INCREASED TO BID.

## 2017-04-28 NOTE — PN
Progress Note (short form)





- Note


Progress Note: 


SUBJECTIVE:


Patient seen and examined.


Chart reviewed.


Comfortable.


Feels okay.


Afebrile.





OBJECTIVE:





 Vital Signs - 8 hr











  04/28/17





  07:52


 


Temperature 98.5 F


 


Pulse Rate 62


 


Respiratory 20





Rate 


 


Blood Pressure 140/72








 Intake & Output











 04/27/17 04/28/17 04/28/17





 23:59 07:59 15:59


 


Intake Total 1900 1450 


 


Balance 1900 1450 


 


Intake:   


 


  IV 1400 1400 


 


    Normal Saline - 1,000 ml 1400 1400 





    @ 125 mls/hr IV ASDIR NASRIN   





    Rx#:[id number]   


 


  IVPB 100 50 


 


  Oral 400  


 


Other:   


 


  Voiding Method Toilet  


 


  # Unmeasured Voids   


 


    Void 1  








Active Medications





Aspirin (Asa -)  81 mg PO DAILY [facility]


   Last Admin: 04/28/17 09:34 Dose:  81 mg


Hydroxyurea (Hydrea -)  500 mg PO DAILY [facility]


Hydroxyurea (Hydrea -)  1,000 mg PO HS [facility]


   Last Admin: 04/27/17 21:38 Dose:  1,000 mg


Sodium Chloride (Normal Saline -)  1,000 mls @ 125 mls/hr IV ASDIR NASRIN


   Last Admin: 04/27/17 13:34 Dose:  125 mls/hr


Cefazolin Sodium (Ancef 1gm Ivpb (Pre-Docked))  50 mls @ 100 mls/hr IVPB Q8H-IV 

[facility]


   Last Admin: 04/28/17 09:36 Dose:  100 mls/hr


Levothyroxine Sodium (Synthroid -)  88 mcg PO DAILY@0700 [facility]


   Last Admin: 04/28/17 06:40 Dose:  88 mcg


Nebivolol (Bystolic -)  5 mg PO DAILY [facility]


   Last Admin: 04/28/17 09:35 Dose:  5 mg


Nystatin/Triamcinolone Acetonide (Mycolog Ii Ointment -)  1 applic TP BID [facility]


   Last Admin: 04/27/17 21:39 Dose:  1 applic


Pantoprazole Sodium (Protonix -)  40 mg PO DAILY [facility]


   Last Admin: 04/28/17 09:34 Dose:  40 mg


Rosuvastatin Calcium (Crestor -)  20 mg PO HS [facility]


   Last Admin: 04/27/17 21:39 Dose:  20 mg


Tramadol HCl (Ultram -)  50 mg PO Q6H PRN


   PRN Reason: HEADACHE


   Last Admin: 04/26/17 22:44 Dose:  50 mg





 CBC, BMP





 04/27/17 10:30 





 04/27/17 10:30 





 Laboratory Results - last 24 hr











  04/27/17 04/27/17 04/27/17





  10:30 10:30 10:30


 


ESR   


 


Sodium  146 H  


 


Potassium  4.5  


 


Chloride  111 H  


 


Carbon Dioxide  28  


 


Anion Gap  7 L  


 


BUN  13  


 


Creatinine  0.8  


 


Creat Clearance w eGFR  > 60  


 


Random Glucose  109 H  


 


Uric Acid  5.7  


 


Calcium  8.9  


 


Iron    60


 


TIBC    295


 


Iron Saturation    20


 


Ferritin   51.415 


 


Total Bilirubin  0.7  


 


AST  17  


 


ALT  18  


 


Alkaline Phosphatase  88  


 


LD Total  207  


 


C-Reactive Protein   1.4 H 


 


Total Protein  6.0 L  


 


Albumin  3.3 L  














  04/27/17





  10:32


 


ESR  10


 


Sodium 


 


Potassium 


 


Chloride 


 


Carbon Dioxide 


 


Anion Gap 


 


BUN 


 


Creatinine 


 


Creat Clearance w eGFR 


 


Random Glucose 


 


Uric Acid 


 


Calcium 


 


Iron 


 


TIBC 


 


Iron Saturation 


 


Ferritin 


 


Total Bilirubin 


 


AST 


 


ALT 


 


Alkaline Phosphatase 


 


LD Total 


 


C-Reactive Protein 


 


Total Protein 


 


Albumin 








 Microbiology











 04/27/17 10:25 Blood Culture - Preliminary





 Blood - Peripheral Venous    NO GROWTH OBTAINED AFTER 24 HOURS, INCUBATION TO 

CONTINUE





    FOR 4 DAYS.


 


 04/27/17 10:20 Blood Culture - Preliminary





 Blood - Peripheral Venous    NO GROWTH OBTAINED AFTER 24 HOURS, INCUBATION TO 

CONTINUE





    FOR 4 DAYS.








PHYSICAL EXAMINATION:


Constitutional: Yes: No Distress, Calm


Cardiovascular: Yes: Regular Rate and Rhythm


Respiratory: Yes: CTA Bilaterally


Gastrointestinal: Yes: Normal Bowel Sounds, Soft.  No: Distention, Tenderness


Extremities: Yes: Other (left 5th toe-- slight bluish in color, pulses palpable

, foot is warm)


Edema: No





 Problem List 





- Problems


(1) Ischemic toe


Code(s): I99.8 - OTHER DISORDER OF CIRCULATORY SYSTEM





(2) Thrombocytosis


Code(s): D47.3 - ESSENTIAL (HEMORRHAGIC) THROMBOCYTHEMIA





(3) Cellulitis


Code(s): L03.90 - CELLULITIS, UNSPECIFIED   Qualifiers: 


     Site of cellulitis of extremity: toe     Laterality: left





(4) HTN (hypertension)


Code(s): I10 - ESSENTIAL (PRIMARY) HYPERTENSION   








ASSESSMENT & PLAN:


-  Overall better.


-  Continue antibiotics.


-  Monitor CBC. 


-  Discussed with Dr. Baldwin today.


-  Workup in progress.


-  Increased Aspirin to BID.


-  Will follow.





Documentation prepared by Sveta Molina, acting as a medical scribe for 

Peter yDer MD.

## 2017-04-29 LAB
ALBUMIN SERPL-MCNC: 3.6 G/DL (ref 3.4–5)
ALP SERPL-CCNC: 86 U/L (ref 45–117)
ALT SERPL-CCNC: 17 U/L (ref 12–78)
ANION GAP SERPL CALC-SCNC: 10 MMOL/L (ref 8–16)
AST SERPL-CCNC: 16 U/L (ref 15–37)
BASOPHILS # BLD: 1.2 % (ref 0–2)
BILIRUB SERPL-MCNC: 0.9 MG/DL (ref 0.2–1)
CALCIUM SERPL-MCNC: 9.7 MG/DL (ref 8.5–10.1)
CO2 SERPL-SCNC: 25 MMOL/L (ref 21–32)
COCKROFT - GAULT: 85.54
CREAT SERPL-MCNC: 0.7 MG/DL (ref 0.55–1.02)
DEPRECATED RDW RBC AUTO: 15.6 % (ref 11.6–15.6)
EOSINOPHIL # BLD: 5.3 % (ref 0–4.5)
GLUCOSE SERPL-MCNC: 85 MG/DL (ref 74–106)
IGG SERPL-MCNC: 703 MG/DL (ref 700–1600)
IGM SERPL-MCNC: 101 MG/DL (ref 26–217)
Lab: 98 % (ref 50–200)
MCH RBC QN AUTO: 26.3 PG (ref 25.7–33.7)
MCHC RBC AUTO-ENTMCNC: 33.2 G/DL (ref 32–36)
MCV RBC: 79.2 FL (ref 80–96)
NEUTROPHILS # BLD: 67 % (ref 42.8–82.8)
PLATELET # BLD AUTO: 856 K/MM3 (ref 134–434)
PMV BLD: 8.7 FL (ref 7.5–11.1)
PROT SERPL-MCNC: 6.3 G/DL (ref 6.4–8.2)
URATE SERPL-SCNC: 4.5 MG/DL (ref 2.6–7.2)
WBC # BLD AUTO: 15.5 K/MM3 (ref 4–10)

## 2017-04-29 RX ADMIN — SODIUM CHLORIDE SCH MLS/HR: 9 INJECTION, SOLUTION INTRAVENOUS at 18:11

## 2017-04-29 RX ADMIN — ASPIRIN 81 MG SCH MG: 81 TABLET ORAL at 21:58

## 2017-04-29 RX ADMIN — HYDROXYUREA SCH MG: 500 CAPSULE ORAL at 21:58

## 2017-04-29 RX ADMIN — NYSTATIN AND TRIAMCINOLONE ACETONIDE SCH APPLIC: 100000; 1 OINTMENT TOPICAL at 09:57

## 2017-04-29 RX ADMIN — NEBIVOLOL HYDROCHLORIDE SCH MG: 5 TABLET ORAL at 09:57

## 2017-04-29 RX ADMIN — SODIUM CHLORIDE SCH: 9 INJECTION, SOLUTION INTRAVENOUS at 17:30

## 2017-04-29 RX ADMIN — SODIUM CHLORIDE SCH MLS/HR: 9 INJECTION, SOLUTION INTRAVENOUS at 09:57

## 2017-04-29 RX ADMIN — CEFAZOLIN SCH MLS/HR: 1 INJECTION, POWDER, FOR SOLUTION INTRAVENOUS at 18:09

## 2017-04-29 RX ADMIN — ROSUVASTATIN CALCIUM SCH MG: 10 TABLET, FILM COATED ORAL at 21:58

## 2017-04-29 RX ADMIN — CEFAZOLIN SCH MLS/HR: 1 INJECTION, POWDER, FOR SOLUTION INTRAVENOUS at 09:56

## 2017-04-29 RX ADMIN — PANTOPRAZOLE SODIUM SCH MG: 40 TABLET, DELAYED RELEASE ORAL at 09:56

## 2017-04-29 RX ADMIN — CEFAZOLIN SCH MLS/HR: 1 INJECTION, POWDER, FOR SOLUTION INTRAVENOUS at 01:47

## 2017-04-29 RX ADMIN — ASPIRIN 81 MG SCH MG: 81 TABLET ORAL at 09:56

## 2017-04-29 RX ADMIN — HYDROXYUREA SCH MG: 500 CAPSULE ORAL at 11:18

## 2017-04-29 RX ADMIN — LEVOTHYROXINE SODIUM SCH MCG: 88 TABLET ORAL at 06:15

## 2017-04-29 NOTE — PN
Progress Note, Physician


Chief Complaint: 


left 5th toe painful











- Current Medication List


Current Medications: 


Active Medications





Aspirin (Asa -)  81 mg PO BID [facility]


   Last Admin: 04/29/17 09:56 Dose:  81 mg


Hydroxyurea (Hydrea -)  1,000 mg PO Q12H [facility]


Sodium Chloride (Normal Saline -)  1,000 mls @ 125 mls/hr IV ASDIR [facility]


   Last Admin: 04/29/17 09:57 Dose:  125 mls/hr


Cefazolin Sodium (Ancef 1gm Ivpb (Pre-Docked))  50 mls @ 100 mls/hr IVPB Q8H-IV 

[facility]


   Last Admin: 04/29/17 09:56 Dose:  100 mls/hr


Levothyroxine Sodium (Synthroid -)  88 mcg PO DAILY@0700 [facility]


   Last Admin: 04/29/17 06:15 Dose:  88 mcg


Nebivolol (Bystolic -)  5 mg PO DAILY [facility]


   Last Admin: 04/29/17 09:57 Dose:  5 mg


Nystatin/Triamcinolone Acetonide (Mycolog Ii Ointment -)  1 applic TP BID [facility]


   Last Admin: 04/29/17 09:57 Dose:  1 applic


Pantoprazole Sodium (Protonix -)  40 mg PO DAILY [facility]


   Last Admin: 04/29/17 09:56 Dose:  40 mg


Rosuvastatin Calcium (Crestor -)  20 mg PO HS [facility]


   Last Admin: 04/28/17 21:31 Dose:  20 mg


Tramadol HCl (Ultram -)  50 mg PO Q6H PRN


   PRN Reason: HEADACHE


   Last Admin: 04/29/17 06:17 Dose:  50 mg











- Objective


Vital Signs: 


 Vital Signs











Temperature  97.3 F L  04/29/17 06:00


 


Pulse Rate  63   04/29/17 06:00


 


Respiratory Rate  18   04/29/17 06:00


 


Blood Pressure  155/84   04/29/17 06:00


 


O2 Sat by Pulse Oximetry (%)  97   04/28/17 21:00











Constitutional: Yes: No Distress


Cardiovascular: Yes: Regular Rate and Rhythm


Respiratory: Yes: CTA Bilaterally


Gastrointestinal: Yes: Normal Bowel Sounds, Soft.  No: Tenderness


Extremities: Yes: Other (bluish discoloration left 5th toe , tender+)


Edema: No


Labs: 


 CBC, BMP





 04/29/17 07:20 





 04/29/17 07:20 





 INR, PTT











INR  1.10  (0.82-1.09)   04/26/17  10:05    














Problem List





- Problems


(1) Ischemic toe


Code(s): I99.8 - OTHER DISORDER OF CIRCULATORY SYSTEM





(2) Thrombocytosis


Code(s): D47.3 - ESSENTIAL (HEMORRHAGIC) THROMBOCYTHEMIA





(3) Cellulitis


Code(s): L03.90 - CELLULITIS, UNSPECIFIED   Qualifiers: 


     Site of cellulitis of extremity: toe     Laterality: left





(4) HTN (hypertension)


Code(s): I10 - ESSENTIAL (PRIMARY) HYPERTENSION   








Assessment/Plan


PLAN


Hematology follow up noted


ASA and Hydroxyurea increased


continue with meds


Check labs


DC planning per Hematology

## 2017-04-29 NOTE — PN
Progress Note (short form)





- Note


Progress Note: 


Patient seen and examined 





Complains of pain in left pinky. Improved on standins. 


Remains bluish in color.


ROS- otherwise non revealing__ no headache, diplopia, epistaxis, dysphagia, GI  

complaints,  complaints 


 Last Vital Signs











Temp Pulse Resp BP Pulse Ox


 


 97.3 F L  63   18   155/84   97 


 


 04/29/17 06:00  04/29/17 06:00  04/29/17 06:00  04/29/17 06:00  04/28/17 21:00











HEENT: GORAN, EOM Intact


Oropharynx: No thrush, No mucositis


Neck: Supple


Cor: RSR, No murmurs, No gallops


Lungs: Clear to P&A


Abd: Soft, Normal bowel sounds, No organomegaly


Ext:No significant edema


Pinky left foot remains  with bluish discoloration


Pulses distally  2+


Skin: No rashes, Integument intact


 CBC, BMP





 04/29/17 07:20 





 04/29/17 07:20 





Impression:





TAMMY-2 positive. This confirms diagnosis of Essential thrombocythemia . 

Platelets remain elevated . To increase hydrea. 


Would maintain BID--  ASA-81 mg.

## 2017-04-30 LAB
ALBUMIN SERPL-MCNC: 3.3 G/DL (ref 3.4–5)
ALP SERPL-CCNC: 77 U/L (ref 45–117)
ALT SERPL-CCNC: 14 U/L (ref 12–78)
ANION GAP SERPL CALC-SCNC: 6 MMOL/L (ref 8–16)
AST SERPL-CCNC: 15 U/L (ref 15–37)
BASOPHILS # BLD: 1.2 % (ref 0–2)
BILIRUB DIRECT SERPL-MCNC: 179 U/L (ref 84–246)
BILIRUB SERPL-MCNC: 0.5 MG/DL (ref 0.2–1)
CALCIUM SERPL-MCNC: 8.9 MG/DL (ref 8.5–10.1)
CO2 SERPL-SCNC: 27 MMOL/L (ref 21–32)
CREAT SERPL-MCNC: 0.6 MG/DL (ref 0.55–1.02)
DEPRECATED RDW RBC AUTO: 15.9 % (ref 11.6–15.6)
EOSINOPHIL # BLD: 4.5 % (ref 0–4.5)
GLUCOSE SERPL-MCNC: 88 MG/DL (ref 74–106)
MCH RBC QN AUTO: 26.4 PG (ref 25.7–33.7)
MCHC RBC AUTO-ENTMCNC: 33.4 G/DL (ref 32–36)
MCV RBC: 79.1 FL (ref 80–96)
NEUTROPHILS # BLD: 70.8 % (ref 42.8–82.8)
PLATELET # BLD AUTO: 842 K/MM3 (ref 134–434)
PMV BLD: 8.7 FL (ref 7.5–11.1)
PROT SERPL-MCNC: 5.8 G/DL (ref 6.4–8.2)
URATE SERPL-SCNC: 4.1 MG/DL (ref 2.6–7.2)
WBC # BLD AUTO: 15.7 K/MM3 (ref 4–10)

## 2017-04-30 RX ADMIN — ASPIRIN 81 MG SCH MG: 81 TABLET ORAL at 11:03

## 2017-04-30 RX ADMIN — PANTOPRAZOLE SODIUM SCH MG: 40 TABLET, DELAYED RELEASE ORAL at 11:03

## 2017-04-30 RX ADMIN — CEFAZOLIN SCH MLS/HR: 1 INJECTION, POWDER, FOR SOLUTION INTRAVENOUS at 11:02

## 2017-04-30 RX ADMIN — NEBIVOLOL HYDROCHLORIDE SCH MG: 5 TABLET ORAL at 11:12

## 2017-04-30 RX ADMIN — CEFAZOLIN SCH MLS/HR: 1 INJECTION, POWDER, FOR SOLUTION INTRAVENOUS at 17:59

## 2017-04-30 RX ADMIN — SODIUM CHLORIDE SCH: 9 INJECTION, SOLUTION INTRAVENOUS at 16:56

## 2017-04-30 RX ADMIN — ASPIRIN 81 MG SCH MG: 81 TABLET ORAL at 21:28

## 2017-04-30 RX ADMIN — HYDROXYUREA SCH MG: 500 CAPSULE ORAL at 11:03

## 2017-04-30 RX ADMIN — LEVOTHYROXINE SODIUM SCH MCG: 88 TABLET ORAL at 06:07

## 2017-04-30 RX ADMIN — ROSUVASTATIN CALCIUM SCH MG: 10 TABLET, FILM COATED ORAL at 21:28

## 2017-04-30 RX ADMIN — CEFAZOLIN SCH MLS/HR: 1 INJECTION, POWDER, FOR SOLUTION INTRAVENOUS at 01:27

## 2017-04-30 NOTE — PN
Progress Note (short form)





- Note


Progress Note: 


Patient seen and examined 


Still with pain left pinky with blue discoloration. 


Pulses intact 


 Last Vital Signs











Temp Pulse Resp BP Pulse Ox


 


 98.9 F   55 L  18   146/75   97 


 


 04/30/17 06:50  04/30/17 11:01  04/30/17 11:01  04/30/17 11:01  04/30/17 09:00











HEENT: GORAN, EOM Intact


Oropharynx: No thrush, No mucositis


Cor: RSR, No murmurs, No gallops


Lungs: scattered rhonchi


Abd: Soft, Normal bowel sounds, No organomegaly


Ext:No significant edema


Skin: bluish discoloration of pinky on left


peripheral pulses intact


 





 04/30/17 06:00 





 04/30/17 06:00 





 Current Medications











Generic Name Dose Route Start Last Admin





  Trade Name Freq  PRN Reason Stop Dose Admin


 


Aspirin  81 mg 04/28/17 22:00 04/30/17 11:03





  Asa -  PO   81 mg





  BID NASRIN   Administration


 


Hydroxyurea  1,000 mg 04/29/17 10:00 04/30/17 11:03





  Hydrea -  PO   1,000 mg





  Q12H NASRIN   Administration


 


Sodium Chloride  1,000 mls @ 125 mls/hr 04/26/17 11:45 04/29/17 18:11





  Normal Saline -  IV   125 mls/hr





  ASDIR NASRIN   Administration


 


Cefazolin Sodium  50 mls @ 100 mls/hr 04/26/17 20:00 04/30/17 11:02





  Ancef 1gm Ivpb (Pre-Docked)  IVPB   100 mls/hr





  Q8H-IV NASRIN   Administration


 


Levothyroxine Sodium  88 mcg 04/27/17 07:00 04/30/17 06:07





  Synthroid -  PO   88 mcg





  DAILY@0700 NASRIN   Administration


 


Nebivolol  5 mg 04/27/17 10:00 04/30/17 11:12





  Bystolic -  PO   5 mg





  DAILY NASRIN   Administration


 


Pantoprazole Sodium  40 mg 04/27/17 10:00 04/30/17 11:03





  Protonix -  PO   40 mg





  DAILY NASRIN   Administration


 


Rosuvastatin Calcium  20 mg 04/26/17 22:00 04/29/17 21:58





  Crestor -  PO   20 mg





  HS NASRIN   Administration


 


Tramadol HCl  50 mg 04/26/17 19:54 04/30/17 01:26





  Ultram -  PO   50 mg





  Q6H PRN   Administration





  HEADACHE   








Impression:





ET


Vascular changes left foot





Plan increase hydrea


Would have vascular f/u

## 2017-04-30 NOTE — PN
Progress Note, Physician


Chief Complaint: 


has pain in left toe





- Current Medication List


Current Medications: 


Active Medications





Aspirin (Asa -)  81 mg PO BID [facility]


   Last Admin: 04/29/17 21:58 Dose:  81 mg


Hydroxyurea (Hydrea -)  1,000 mg PO Q12H [facility]


   Last Admin: 04/29/17 21:58 Dose:  1,000 mg


Sodium Chloride (Normal Saline -)  1,000 mls @ 125 mls/hr IV ASDIR [facility]


   Last Admin: 04/29/17 18:11 Dose:  125 mls/hr


Cefazolin Sodium (Ancef 1gm Ivpb (Pre-Docked))  50 mls @ 100 mls/hr IVPB Q8H-IV 

[facility]


   Last Admin: 04/30/17 01:27 Dose:  100 mls/hr


Levothyroxine Sodium (Synthroid -)  88 mcg PO DAILY@0700 [facility]


   Last Admin: 04/30/17 06:07 Dose:  88 mcg


Nebivolol (Bystolic -)  5 mg PO DAILY [facility]


   Last Admin: 04/29/17 09:57 Dose:  5 mg


Pantoprazole Sodium (Protonix -)  40 mg PO DAILY [facility]


   Last Admin: 04/29/17 09:56 Dose:  40 mg


Rosuvastatin Calcium (Crestor -)  20 mg PO HS [facility]


   Last Admin: 04/29/17 21:58 Dose:  20 mg


Tramadol HCl (Ultram -)  50 mg PO Q6H PRN


   PRN Reason: HEADACHE


   Last Admin: 04/30/17 01:26 Dose:  50 mg











- Objective


Vital Signs: 


 Vital Signs











Temperature  98.9 F   04/30/17 06:50


 


Pulse Rate  63   04/30/17 06:50


 


Respiratory Rate  18   04/30/17 06:50


 


Blood Pressure  146/97   04/30/17 06:50


 


O2 Sat by Pulse Oximetry (%)  97   04/29/17 09:00











Constitutional: Yes: No Distress


Cardiovascular: Yes: Regular Rate and Rhythm


Respiratory: Yes: CTA Bilaterally


Extremities: Yes: Other (decreased blue discoloration left 5 th toe)


Edema: No


Labs: 


 CBC, BMP





 04/30/17 06:00 





 04/30/17 06:00 





 INR, PTT











INR  1.10  (0.82-1.09)   04/26/17  10:05    














Problem List





- Problems


(1) Ischemic toe


Code(s): I99.8 - OTHER DISORDER OF CIRCULATORY SYSTEM





(2) Thrombocytosis


Code(s): D47.3 - ESSENTIAL (HEMORRHAGIC) THROMBOCYTHEMIA





(3) Cellulitis


Code(s): L03.90 - CELLULITIS, UNSPECIFIED   Qualifiers: 


     Site of cellulitis of extremity: toe     Laterality: left





(4) HTN (hypertension)


Code(s): I10 - ESSENTIAL (PRIMARY) HYPERTENSION   Qualifiers: 


     Hypertension type: essential hypertension        Qualified Code(s): I10 - 

Essential (primary) hypertension  








Assessment/Plan


PLAN


spoke with Hematology 


may need to increase Hydroxyurea further 


platelets still high


dc antibiotics-- not cellulitis


pain control


OOB

## 2017-05-01 LAB
DEPRECATED RDW RBC AUTO: 15.8 % (ref 11.6–15.6)
MCH RBC QN AUTO: 26.3 PG (ref 25.7–33.7)
MCHC RBC AUTO-ENTMCNC: 33.2 G/DL (ref 32–36)
MCV RBC: 79.3 FL (ref 80–96)
PLATELET # BLD AUTO: 924 K/MM3 (ref 134–434)
PMV BLD: 9 FL (ref 7.5–11.1)
WBC # BLD AUTO: 15.1 K/MM3 (ref 4–10)

## 2017-05-01 RX ADMIN — ROSUVASTATIN CALCIUM SCH MG: 10 TABLET, FILM COATED ORAL at 23:05

## 2017-05-01 RX ADMIN — ASPIRIN 81 MG SCH MG: 81 TABLET ORAL at 23:04

## 2017-05-01 RX ADMIN — LEVOTHYROXINE SODIUM SCH MCG: 88 TABLET ORAL at 06:55

## 2017-05-01 RX ADMIN — SODIUM CHLORIDE SCH: 9 INJECTION, SOLUTION INTRAVENOUS at 16:46

## 2017-05-01 RX ADMIN — PANTOPRAZOLE SODIUM SCH MG: 40 TABLET, DELAYED RELEASE ORAL at 10:59

## 2017-05-01 RX ADMIN — ASPIRIN 81 MG SCH MG: 81 TABLET ORAL at 10:59

## 2017-05-01 RX ADMIN — HYDROXYUREA SCH MG: 500 CAPSULE ORAL at 23:05

## 2017-05-01 RX ADMIN — AMLODIPINE BESYLATE SCH MG: 5 TABLET ORAL at 13:44

## 2017-05-01 RX ADMIN — NEBIVOLOL HYDROCHLORIDE SCH MG: 5 TABLET ORAL at 10:59

## 2017-05-01 NOTE — PN
Progress Note (short form)





- Note


Progress Note: 


Subjective


Patient seen and examined.


Chart reviewed.


All follow ups noted.


Denies pain.


L foot fifth toe remains blue-liam.








Objective


 Last Vital Signs











Temp Pulse Resp BP Pulse Ox


 


 98.5 F   62   18   145/83   97 


 


 05/01/17 06:00  05/01/17 06:00  05/01/17 06:00  05/01/17 06:00  04/30/17 09:00











 CBC, BMP





 05/01/17 06:00 





 04/30/17 06:00 








 Laboratory Results - last 24 hr











  05/01/17





  06:00


 


WBC  15.1 H


 


RBC  4.79


 


Hgb  12.6


 


Hct  38.0


 


MCV  79.3 L


 


MCHC  33.2


 


RDW  15.8 H


 


Plt Count  924 H


 


MPV  9.0

















Physical Exam


Constitutional: Yes: No Distress


Cardiovascular: Yes: Regular Rate and Rhythm


Respiratory: Yes: CTA Bilaterally


Extremities: Yes: Other (decreased blue discoloration left 5 th toe)


Edema: No








Assessment and Plan


Continue present care.


Hydroxyurea increased.


Platelets increased today.


Continue hydroxyurea and aspirin.


Bone marrow biopsy??


Will discuss with Dr. Baldwin


Discussed with Dr. Ben Bishop today.


No surgical intervention planned.


Monitor platelets.








Documentation prepared by Nirali Eubanks, acting as a medical scribe for 

Peter Dyer MD.

## 2017-05-01 NOTE — CON.CARD
Consult


Consult Specialty:: cardio


Referred by:: kurt


Reason for Consultation:: ? toe ischemia/embolism





- History of Present Illness


Chief Complaint: blue toe


History of Present Illness: 


65 yo female here with blue toe;


being evaluated by vascular--no surgery/procedures planned;


? raynaud's related





suspected myeloproliferative disorder per heme--hydrea started





she was dx'd with Raynaud's years ago by MD at [facility], never rx'd meds.


says "all tests were good".


has not had f/u with rheum





denies cp, sob, palpitations








PMH: hypertension, hyperlipidemia,hypothyroidism, goiter s/p surgery, lower 

back pain, migraines, Raynauds syndrome, and possible thrombocythemia, who 

presents  for evaluation of blue left pinky toe for approximately 2 weeks





- Past Medical History


Cardio/Vascular: Yes: HTN, Hyperlipdemia


Rheumatology: Yes: Other (Reynaud's disease)


Endocrine: Yes: Hypothyroidism





- Alcohol/Substance Use


Hx Alcohol Use: No





- Smoking History


Smoking history: Former smoker


Have you smoked in the past 12 months: No


If you are a former smoker, when did you quit?: 1990





Home Medications





- Allergies


Allergies/Adverse Reactions: 


 Allergies











Allergy/AdvReac Type Severity Reaction Status Date / Time


 


No Known Allergies Allergy   Verified 04/26/17 09:33














- Home Medications


Home Medications: 


Ambulatory Orders





Levothyroxine [Synthroid -] 88 mcg PO DAILY 04/13/15 


Rosuvastatin Calcium [Crestor] 20 mg PO HS 04/13/15 


Tramadol HCl [Ultram -] 50 mg PO PRN PRN 04/13/15 


Aspirin [ASA -] 81 mg PO DAILY 04/26/17 


Hydroxyurea [Hydrea] 500 mg PO BID 04/26/17 


Nebivolol [Bystolic -] 5 mg PO DAILY 04/26/17 


Omeprazole 40 mg PO DAILY 04/26/17 











Review of Systems





- Review of Systems


Constitutional: denies: Chills, Fever


Eyes: denies: Eye Pain


HENT: denies: Nasal Congestion


Neck: denies: Stiffness


Cardiovascular: denies: Palpitations


Respiratory: denies: Orthopnea, PND


Gastrointestinal: denies: Diarrhea, Rectal Bleeding


Genitourinary: denies: Burning, Hematuria


Musculoskeletal: denies: Muscle Pain


Integumentary: denies: Rash


Neurological: denies: Numbness, Seizure, Syncope


Endocrine: denies: Excessive Sweating


Hematology/Lymphatic: denies: Excessive Bleeding


Vital Signs: 


 Vital Signs











Temperature  98.5 F   05/01/17 06:00


 


Pulse Rate  62   05/01/17 06:00


 


Respiratory Rate  18   05/01/17 06:00


 


Blood Pressure  145/83   05/01/17 06:00


 


O2 Sat by Pulse Oximetry (%)  97   04/30/17 09:00











Constitutional: Yes: Well Nourished, No Distress


Eyes: No: Sclera Icterus


HENT: No: Nasal Congestion


Neck: No: Decreased ROM


Respiratory: Yes: CTA Bilaterally.  No: Accessory Muscle Use


Gastrointestinal: Yes: Normal Bowel Sounds.  No: Distention, Hepatomegaly, 

Palpable Mass, Tenderness


Cardiovascular: Yes: Regular Rate and Rhythm


JVD: No


Carotid Bruit: No


PMI: Non-Displaced


Heart Sounds: Yes: S1, S2.  No: Gallop


Murmur: No: Systolic Murmur, Diastolic Murmur


Musculoskeletal: Yes: Other (No kyphosis)


Extremities: Yes: Cyanosis (L 5th toe; 2+ DP pulse; skin not cool).  No: Cool


Edema: No


Peripheral Pulses: 2+ Left Carotid, 2+ Right Carotid, 2+ Left Doralis Pedis, 2+ 

Right Dorsalis Pedis


Integumentary: No: Jaundice


Neurological: Yes: Alert, Oriented (x3)


Psychiatric: No: Agitated





- Other Data


Labs, Other Data: 


 CBC, BMP





 05/01/17 06:00 





 04/30/17 06:00 





 INR, PTT











INR  1.10  (0.82-1.09)   04/26/17  10:05    








 Laboratory Tests











  04/30/17 05/01/17





  06:00 06:00


 


WBC   15.1 H


 


Hgb   12.6


 


Plt Count   924 H


 


Sodium  142 


 


Potassium  5.0 


 


Carbon Dioxide  27 


 


BUN  11 


 


Creatinine  0.6 


 


AST  15 


 


ALT  14 











ekg 4/26: NSR; normal axis/interv; no path Q's; NSTWAs (no old)





Assessment/Plan


HTN:


-bp controlled


-same meds


-CCB add-on trial as below (if bp drops, hold bystolic)





HPL:


-cont outpt tx





hi PLTs/WBCs, suspected myeloproliferative disorder


-heme following, on hydrea





peripheral cyanosis:


-vascular following


-PAD not suspected by them (good palpable pulse on exam, skin not cool)


-? raynaud's related--will add amlodipine to bp regimen in case this is raynaud'

s


-? due to sluggish flow from thrombocytopenia-->hyperviscosity (on meds per heme

)


-will check LE arterial doppler though distal embolism seems very unlikely per 

vascular


-echo as below


-defer holter if no suspicious findings for embolism arise





abnormal ECG:


-nonspecific TWAs


-no angina sx's


-will check echo


-defer stress test if remains asymptomatic

## 2017-05-01 NOTE — CONSULT
Consult


Consult Specialty:: Rheumatology





- History of Present Illness


History of Present Illness: 


66 year old female, with a significant past medical history of Raynauds 

syndrome, intermittent dysphagia, hypertension, hyperlipidemia,hypothyroidism, 

goiter s/p surgery, lower back pain, migraines andessential thrombocythemia, 

admitted with blue discoloration of the left 5th toe.





HPI.  Two weeks ago she developed mild discomfort in the left 5th toe. One week 

ago she developed bluish discoloration of the left 5th toe and swelling of the 

left foot.   The swelling of the foot improved, however she continues to have 

blue discoloration of the toe.  On admission she was found to have platelets 

count of 873 and ESR of 10.  Pulses were normal. Work-up by hematology revealed 

Jak2 mutation diagnostic for essential thrombocytosis.  She has been treated 

with Hydroxyurea.





Raynaud's phenomenon.  Isaw the patient in May 2016. At that time she reported 

progressive Raynaud's since 2014, pain in maritza right wrist and a 4 month history 

of dysphagia to solids and liquids. Work-up revealed SPEEDY 1:320 with centromere 

pattern.  Anti-SCL=to and RNA polymerase III were negative.  On the physical 

examination there was no sclerodactily or proximal scleroderma, she had no 

telangiectasia, calcinosis or ischemic changes in tips of digits.    Based on 

these findings she did nto have scleroderma, however I could not rule out an 

early stage of CREST.  I suggested PFT and Echo that apparently were not done. 


Further work-up in this admission revealed an essential normal echocardiogram (

RVSP 25), and barium swallow normal





- Past Medical History


Cardio/Vascular: Yes: HTN, Hyperlipdemia


Rheumatology: Yes: Other (Reynaud's disease)


Endocrine: Yes: Hypothyroidism





- Alcohol/Substance Use


Hx Alcohol Use: No





- Smoking History


Smoking history: Former smoker


Have you smoked in the past 12 months: No


If you are a former smoker, when did you quit?: 1990





Home Medications





- Allergies


Allergies/Adverse Reactions: 


 Allergies











Allergy/AdvReac Type Severity Reaction Status Date / Time


 


No Known Allergies Allergy   Verified 04/26/17 09:33














- Home Medications


Home Medications: 


Ambulatory Orders





Levothyroxine [Synthroid -] 88 mcg PO DAILY 04/13/15 


Rosuvastatin Calcium [Crestor] 20 mg PO HS 04/13/15 


Tramadol HCl [Ultram -] 50 mg PO PRN PRN 04/13/15 


Aspirin [ASA -] 81 mg PO DAILY 04/26/17 


Hydroxyurea [Hydrea] 500 mg PO BID 04/26/17 


Nebivolol [Bystolic -] 5 mg PO DAILY 04/26/17 


Omeprazole 40 mg PO DAILY 04/26/17 











Review of Systems





- Review of Systems


Constitutional: reports: No Symptoms


Eyes: reports: No Symptoms


HENT: reports: No Symptoms


Neck: reports: No Symptoms


Cardiovascular: reports: No Symptoms


Respiratory: reports: No Symptoms


Gastrointestinal: reports: No Symptoms


Musculoskeletal: reports: Other


Integumentary: reports: Other (See HPI)





Physical Exam


Vital Signs: 


 Vital Signs











Temperature  98.2 F   05/01/17 17:48


 


Pulse Rate  60   05/01/17 17:48


 


Respiratory Rate  18   05/01/17 17:48


 


Blood Pressure  153/74   05/01/17 17:48


 


O2 Sat by Pulse Oximetry (%)  97   05/01/17 09:00











Constitutional: Yes: No Distress


Eyes: Yes: WNL


HENT: Yes: WNL


Neck: Yes: WNL


Cardiovascular: Yes: WNL


Respiratory: Yes: WNL


Gastrointestinal: Yes: WNL


Musculoskeletal: Yes: Other (No active joints)


Extremities: Yes: Other (Bluish discoloration and tenderness in the left 5th 

toe.  Pulses were normal.)


Labs: 


 CBC, BMP





 05/01/17 06:00 





 04/30/17 06:00 





 Laboratory Tests











  04/27/17 04/27/17 04/28/17





  10:30 10:32 06:15


 


ESR   10 


 


Total Bilirubin   


 


AST   


 


ALT   


 


Alkaline Phosphatase   


 


LD Total   


 


C-Reactive Protein  1.4 H  


 


IgG    703


 


IgA    125


 


IgM    101














  04/30/17





  06:00


 


ESR 


 


Total Bilirubin  0.5  D


 


AST  15


 


ALT  14


 


Alkaline Phosphatase  77


 


LD Total  179


 


C-Reactive Protein 


 


IgG 


 


IgA 


 


IgM 














Problem List





- Problems


(1) Ischemic toe


Assessment/Plan: 


Ischemia in the left 45th toe, etiology to be determined.  TRule out ischemia 

secondary to thrombocytosis, and Rule our Raynaud's.  Less likely 

atherosclerotic plaque.  The patient does not have scleroderma, questionable 

early CREST. She does nto have other connective tissue disease - no vasculitis,


Plan: I will discuss with Hemastology and PCP - obtaining skin Bx.   Continue 

same medications





Code(s): I99.8 - OTHER DISORDER OF CIRCULATORY SYSTEM

## 2017-05-01 NOTE — PN
Progress Note (short form)





- Note


Progress Note: 


PAtient seen and examined





Lt. foot purplish toe


 Last Vital Signs











Temp Pulse Resp BP Pulse Ox


 


 98.6 F   60   18   146/67   97 


 


 05/01/17 14:36  05/01/17 14:36  05/01/17 14:36  05/01/17 14:36  05/01/17 09:00











Cor: RSR, No murmurs, No gallops


Lungs: Clear to P&A


Abd: Soft, Normal bowel sounds, No organomegaly


Ext:No significant edema





 Abnormal Lab Results











  05/01/17





  06:00


 


WBC  15.1 H


 


MCV  79.3 L


 


RDW  15.8 H


 


Plt Count  924 H








Active Medications











Generic Name Dose Route Start Last Admin





  Trade Name Freq  PRN Reason Stop Dose Admin


 


Amlodipine Besylate  5 mg 05/01/17 11:45 05/01/17 13:44





  Norvasc -  PO   5 mg





  DAILY NASRIN   Administration


 


Aspirin  81 mg 04/28/17 22:00 05/01/17 10:59





  Asa -  PO   81 mg





  BID NASRIN   Administration


 


Hydroxyurea  500 mg 05/02/17 10:00  





  Hydrea -  PO   





  DAILY NASRIN   


 


Hydroxyurea  1,000 mg 05/01/17 22:00  





  Hydrea -  PO   





  HS NASRIN   


 


Levothyroxine Sodium  88 mcg 04/27/17 07:00 05/01/17 06:55





  Synthroid -  PO   88 mcg





  DAILY@0700 NASRIN   Administration


 


Nebivolol  5 mg 04/27/17 10:00 05/01/17 10:59





  Bystolic -  PO   5 mg





  DAILY NASRIN   Administration


 


Pantoprazole Sodium  40 mg 04/27/17 10:00 05/01/17 10:59





  Protonix -  PO   40 mg





  DAILY NASRIN   Administration


 


Rosuvastatin Calcium  20 mg 04/26/17 22:00 04/30/17 21:28





  Crestor -  PO   20 mg





  HS NASRIN   Administration


 


Tramadol HCl  50 mg 04/26/17 19:54 04/30/17 01:26





  Ultram -  PO   50 mg





  Q6H PRN   Administration





  HEADACHE   








A/P





67 y/o patient with thrombocytosis/purple toe


JAK2+


On hydrea 1500mg daily/1000 mg daily alternating-- will change to 1500mg daily


On ASA 81 mg bid





disucssed with vascular--microvascular ischemia


continue ASA/Hydrea


monitor

## 2017-05-01 NOTE — PN
Progress Note (short form)





- Note


Progress Note: 


Vascular Surgery 





Pt seen and examined. Walking without 


any pain. 


Left foot - palapable DP pulse. 


Toe less ischemic. less pain. 


Will cont to monitor. 





Ben Bishop DO

## 2017-05-02 LAB
ALBUMIN SERPL-MCNC: 3.1 G/DL (ref 2.9–4.4)
ALBUMIN SERPL-MCNC: 3.6 G/DL (ref 3.4–5)
ALBUMIN/GLOB SERPL: 1.2 {RATIO} (ref 0.7–1.7)
ALP SERPL-CCNC: 91 U/L (ref 45–117)
ALT SERPL-CCNC: 17 U/L (ref 12–78)
ANION GAP SERPL CALC-SCNC: 10 MMOL/L (ref 8–16)
AST SERPL-CCNC: 16 U/L (ref 15–37)
BASOPHILS # BLD: 1.4 % (ref 0–2)
BILIRUB DIRECT SERPL-MCNC: 216 U/L (ref 84–246)
BILIRUB SERPL-MCNC: 0.7 MG/DL (ref 0.2–1)
CALCIUM SERPL-MCNC: 9.7 MG/DL (ref 8.5–10.1)
CO2 SERPL-SCNC: 26 MMOL/L (ref 21–32)
COCKROFT - GAULT: 74.84
CREAT SERPL-MCNC: 0.8 MG/DL (ref 0.55–1.02)
DEPRECATED RDW RBC AUTO: 15.2 % (ref 11.6–15.6)
EOSINOPHIL # BLD: 3.1 % (ref 0–4.5)
GLOBULIN SER CALC-MCNC: 2.6 G/DL (ref 2.2–3.9)
GLUCOSE SERPL-MCNC: 121 MG/DL (ref 74–106)
MCH RBC QN AUTO: 26.8 PG (ref 25.7–33.7)
MCHC RBC AUTO-ENTMCNC: 34.2 G/DL (ref 32–36)
MCV RBC: 78.6 FL (ref 80–96)
NEUTROPHILS # BLD: 71.3 % (ref 42.8–82.8)
PLATELET # BLD AUTO: 944 K/MM3 (ref 134–434)
PMV BLD: 8.9 FL (ref 7.5–11.1)
PROT SERPL-MCNC: 5.7 G/DL (ref 6–8.5)
PROT SERPL-MCNC: 6.4 G/DL (ref 6.4–8.2)
URATE SERPL-SCNC: 4.6 MG/DL (ref 2.6–7.2)
WBC # BLD AUTO: 13.1 K/MM3 (ref 4–10)

## 2017-05-02 RX ADMIN — ASPIRIN 81 MG SCH MG: 81 TABLET ORAL at 10:31

## 2017-05-02 RX ADMIN — HYDROXYUREA SCH MG: 500 CAPSULE ORAL at 23:00

## 2017-05-02 RX ADMIN — ASPIRIN 81 MG SCH MG: 81 TABLET ORAL at 23:00

## 2017-05-02 RX ADMIN — AMLODIPINE BESYLATE SCH MG: 5 TABLET ORAL at 10:31

## 2017-05-02 RX ADMIN — HYDROXYUREA SCH MG: 500 CAPSULE ORAL at 10:32

## 2017-05-02 RX ADMIN — ANAGRELIDE HYDROCHLORIDE SCH MG: 0.5 CAPSULE ORAL at 22:59

## 2017-05-02 RX ADMIN — LEVOTHYROXINE SODIUM SCH MCG: 88 TABLET ORAL at 07:07

## 2017-05-02 RX ADMIN — NEBIVOLOL HYDROCHLORIDE SCH MG: 5 TABLET ORAL at 10:31

## 2017-05-02 RX ADMIN — ANAGRELIDE HYDROCHLORIDE SCH MG: 0.5 CAPSULE ORAL at 17:38

## 2017-05-02 RX ADMIN — ROSUVASTATIN CALCIUM SCH MG: 10 TABLET, FILM COATED ORAL at 23:00

## 2017-05-02 RX ADMIN — PANTOPRAZOLE SODIUM SCH MG: 40 TABLET, DELAYED RELEASE ORAL at 10:31

## 2017-05-02 NOTE — PN
Progress Note, Physician


Chief Complaint: 


has pain in left toe, decreased 





- Current Medication List


Current Medications: 


Active Medications





Amlodipine Besylate (Norvasc -)  5 mg PO DAILY [facility]


   Last Admin: 05/02/17 10:31 Dose:  5 mg


Aspirin (Asa -)  81 mg PO BID [facility]


   Last Admin: 05/02/17 10:31 Dose:  81 mg


Hydroxyurea (Hydrea -)  1,500 mg PO BID [facility]


   Last Admin: 05/02/17 10:32 Dose:  1,500 mg


Levothyroxine Sodium (Synthroid -)  88 mcg PO DAILY@0700 [facility]


   Last Admin: 05/02/17 07:07 Dose:  88 mcg


Nebivolol (Bystolic -)  5 mg PO DAILY [facility]


   Last Admin: 05/02/17 10:31 Dose:  5 mg


Pantoprazole Sodium (Protonix -)  40 mg PO DAILY [facility]


   Last Admin: 05/02/17 10:31 Dose:  40 mg


Rosuvastatin Calcium (Crestor -)  20 mg PO HS [facility]


   Last Admin: 05/01/17 23:05 Dose:  20 mg


Tramadol HCl (Ultram -)  50 mg PO Q6H PRN


   PRN Reason: HEADACHE


   Last Admin: 05/01/17 23:06 Dose:  50 mg











- Objective


Vital Signs: 


 Vital Signs











Temperature  100.4 F H  05/02/17 09:00


 


Pulse Rate  63   05/02/17 09:00


 


Respiratory Rate  16   05/02/17 09:00


 


Blood Pressure  150/80   05/02/17 09:00


 


O2 Sat by Pulse Oximetry (%)  97   05/02/17 09:00











Constitutional: Yes: No Distress


Cardiovascular: Yes: Regular Rate and Rhythm


Respiratory: Yes: CTA Bilaterally


Gastrointestinal: Yes: Normal Bowel Sounds, Soft.  No: Tenderness


Extremities: Yes: Other (left 5th toe blue)


Edema: No


Labs: 


 CBC, BMP





 05/02/17 06:15 





 04/30/17 06:00 





 INR, PTT











INR  1.10  (0.82-1.09)   04/26/17  10:05    














Problem List





- Problems


(1) Ischemic toe


Code(s): I99.8 - OTHER DISORDER OF CIRCULATORY SYSTEM





(2) Thrombocytosis


Code(s): D47.3 - ESSENTIAL (HEMORRHAGIC) THROMBOCYTHEMIA





(3) Cellulitis


Code(s): L03.90 - CELLULITIS, UNSPECIFIED   Qualifiers: 


     Site of cellulitis of extremity: toe     Laterality: left





(4) HTN (hypertension)


Code(s): I10 - ESSENTIAL (PRIMARY) HYPERTENSION   Qualifiers: 


     Hypertension type: essential hypertension        Qualified Code(s): I10 - 

Essential (primary) hypertension  








Assessment/Plan


PLAN





platelets higher


Hematology follow up 


continue with meds


Rheumatology eval noted-- pt does not want to do skin biopsy 


pain control


OOB 


arterial doppler normal


Echo - normal

## 2017-05-02 NOTE — PN
Progress Note (short form)





- Note


Progress Note: 


Patient seen and examined 


Prior notes reviewed 


 Last Vital Signs











Temp Pulse Resp BP Pulse Ox


 


 97.9 F   62   18   115/58   97 


 


 05/02/17 14:00  05/02/17 14:00  05/02/17 14:00  05/02/17 14:00  05/02/17 09:00











HEENT: GORAN, EOM Intact


Oropharynx: No thrush, No mucositis


Cor: RSR, No murmurs, No gallops


Lungs: Clear to P&A


Abd: Soft, Normal bowel sounds, No organomegaly


Ext: left 5th toe remains bluish with good pulses


Skin: No rashes, Integument intact


 CBC, BMP





 05/02/17 06:15 





 05/02/17 13:20 





 Current Medications











Generic Name Dose Route Start Last Admin





  Trade Name Freq  PRN Reason Stop Dose Admin


 


Amlodipine Besylate  5 mg 05/01/17 11:45 05/02/17 10:31





  Norvasc -  PO   5 mg





  DAILY NASRIN   Administration


 


Aspirin  81 mg 04/28/17 22:00 05/02/17 10:31





  Asa -  PO   81 mg





  BID NASRIN   Administration


 


Hydroxyurea  1,500 mg 05/01/17 22:00 05/02/17 10:32





  Hydrea -  PO   1,500 mg





  BID NASRIN   Administration


 


Levothyroxine Sodium  88 mcg 04/27/17 07:00 05/02/17 07:07





  Synthroid -  PO   88 mcg





  DAILY@0700 NASRIN   Administration


 


Nebivolol  5 mg 04/27/17 10:00 05/02/17 10:31





  Bystolic -  PO   5 mg





  DAILY NASRIN   Administration


 


Pantoprazole Sodium  40 mg 04/27/17 10:00 05/02/17 10:31





  Protonix -  PO   40 mg





  DAILY NASRIN   Administration


 


Rosuvastatin Calcium  20 mg 04/26/17 22:00 05/01/17 23:05





  Crestor -  PO   20 mg





  HS NASRIN   Administration


 


Tramadol HCl  50 mg 04/26/17 19:54 05/01/17 23:06





  Ultram -  PO   50 mg





  Q6H PRN   Administration





  HEADACHE   








Impression:





E.T. 


Blue toe


thrombocytosis secondaryt to E.T.





Plan:  





Add agrylin 


?? cialis  as vasodilator

## 2017-05-03 LAB
ALBUMIN SERPL-MCNC: 3.3 G/DL (ref 3.4–5)
ALP SERPL-CCNC: 86 U/L (ref 45–117)
ALT SERPL-CCNC: 18 U/L (ref 12–78)
ANION GAP SERPL CALC-SCNC: 11 MMOL/L (ref 8–16)
AST SERPL-CCNC: 15 U/L (ref 15–37)
BASOPHILS # BLD: 0.6 % (ref 0–2)
BILIRUB SERPL-MCNC: 0.9 MG/DL (ref 0.2–1)
CALCIUM SERPL-MCNC: 9.4 MG/DL (ref 8.5–10.1)
CO2 SERPL-SCNC: 24 MMOL/L (ref 21–32)
COCKROFT - GAULT: 85.54
CREAT SERPL-MCNC: 0.7 MG/DL (ref 0.55–1.02)
DEPRECATED RDW RBC AUTO: 15.4 % (ref 11.6–15.6)
EOSINOPHIL # BLD: 3.2 % (ref 0–4.5)
GLUCOSE SERPL-MCNC: 114 MG/DL (ref 74–106)
LDH SERPL-CCNC: 242 IU/L (ref 119–226)
LDH1 CFR SERPL ELPH: 18 % (ref 17–32)
LDH2 CFR SERPL ELPH: 33 % (ref 25–40)
LDH3 CFR SERPL ELPH: 27 % (ref 17–27)
LDH4 CFR SERPL ELPH: 10 % (ref 5–13)
LDH5 CFR SERPL ELPH: 12 % (ref 4–20)
MCH RBC QN AUTO: 27.2 PG (ref 25.7–33.7)
MCHC RBC AUTO-ENTMCNC: 34.7 G/DL (ref 32–36)
MCV RBC: 78.5 FL (ref 80–96)
NEUTROPHILS # BLD: 71.8 % (ref 42.8–82.8)
PLATELET # BLD AUTO: 907 K/MM3 (ref 134–434)
PMV BLD: 8.7 FL (ref 7.5–11.1)
PROT SERPL-MCNC: 6 G/DL (ref 6.4–8.2)
WBC # BLD AUTO: 9.5 K/MM3 (ref 4–10)

## 2017-05-03 RX ADMIN — HYDROXYUREA SCH: 500 CAPSULE ORAL at 11:09

## 2017-05-03 RX ADMIN — ANAGRELIDE HYDROCHLORIDE SCH MG: 0.5 CAPSULE ORAL at 18:21

## 2017-05-03 RX ADMIN — AMLODIPINE BESYLATE SCH MG: 5 TABLET ORAL at 10:59

## 2017-05-03 RX ADMIN — ANAGRELIDE HYDROCHLORIDE SCH MG: 0.5 CAPSULE ORAL at 15:46

## 2017-05-03 RX ADMIN — ANAGRELIDE HYDROCHLORIDE SCH MG: 0.5 CAPSULE ORAL at 21:32

## 2017-05-03 RX ADMIN — PANTOPRAZOLE SODIUM SCH MG: 40 TABLET, DELAYED RELEASE ORAL at 10:59

## 2017-05-03 RX ADMIN — HYDROXYUREA SCH MG: 500 CAPSULE ORAL at 11:01

## 2017-05-03 RX ADMIN — ASPIRIN 81 MG SCH MG: 81 TABLET ORAL at 10:59

## 2017-05-03 RX ADMIN — ROSUVASTATIN CALCIUM SCH MG: 10 TABLET, FILM COATED ORAL at 21:31

## 2017-05-03 RX ADMIN — ASPIRIN 81 MG SCH MG: 81 TABLET ORAL at 21:31

## 2017-05-03 RX ADMIN — NEBIVOLOL HYDROCHLORIDE SCH MG: 5 TABLET ORAL at 11:00

## 2017-05-03 RX ADMIN — ANAGRELIDE HYDROCHLORIDE SCH MG: 0.5 CAPSULE ORAL at 11:01

## 2017-05-03 RX ADMIN — LEVOTHYROXINE SODIUM SCH MCG: 88 TABLET ORAL at 07:16

## 2017-05-03 RX ADMIN — HYDROXYUREA SCH MG: 500 CAPSULE ORAL at 21:31

## 2017-05-03 NOTE — PN
Progress Note, SLP





- Note


Progress Note: 


Pt reports to be swallowing better, alternating solids with liquids to reduce 

subjective stasis of food.


 Selected Entries











  04/30/17 04/30/17 04/30/17





  12:35 15:44 18:30


 


Breakfast 75%  


 


Lunch  75% 


 


Supper   75%


 


Temperature   














  05/01/17 05/01/17 05/02/17





  10:00 10:46 07:39


 


Breakfast 75% 75% 


 


Lunch   


 


Supper   


 


Temperature   98.6 F














  05/02/17 05/02/17 05/02/17





  09:00 10:00 14:00


 


Breakfast  100% 


 


Lunch   100%


 


Supper   


 


Temperature 100.4 F H  97.9 F














  05/02/17 05/02/17 05/02/17





  16:30 18:30 22:00


 


Breakfast   


 


Lunch   


 


Supper  100% 100%


 


Temperature 98.8 F  














  05/03/17





  06:41


 


Breakfast 


 


Lunch 


 


Supper 


 


Temperature 99 F








No further f/u indicated at this time.

## 2017-05-03 NOTE — PN
Progress Note (short form)





- Note


Progress Note: 


PAtient seen and examined





Lt. foot purplish toe--still painful but discoloration improved


Will some chills and erythema at IV site


Also with erythema over face/hands


 


 Last Vital Signs











Temp Pulse Resp BP Pulse Ox


 


 99.2 F   80   20   128/69   97 


 


 05/03/17 14:49  05/03/17 14:49  05/03/17 14:49  05/03/17 06:41  05/02/17 21:00














Cor: RSR, No murmurs, No gallops


Lungs: Clear to P&A


Abd: Soft, Normal bowel sounds, No organomegaly


Ext: Lt. foot purplish toe





 


 Abnormal Lab Results











  04/29/17 05/03/17 05/03/17





  07:20 06:30 06:30


 


MCV   78.5 L 


 


Plt Count   907 H 


 


Chloride    108 H


 


Random Glucose    114 H


 


LD Total  242 H  


 


Total Protein    6.0 L


 


Albumin    3.3 L











Active Medications











Generic Name Dose Route Start Last Admin





  Trade Name Freq  PRN Reason Stop Dose Admin


 


Amlodipine Besylate  5 mg 05/01/17 11:45 05/03/17 10:59





  Norvasc -  PO   5 mg





  DAILY NASRIN   Administration


 


Anagrelide HCl  0.5 mg 05/02/17 18:00 05/03/17 15:46





  Agrylin -  PO   0.5 mg





  QID NASRIN   Administration


 


Aspirin  81 mg 04/28/17 22:00 05/03/17 10:59





  Asa -  PO   81 mg





  BID NASRIN   Administration


 


Colloidal Oatmeal  1 applic 05/03/17 11:38  





  Aveeno Soap -  TP   





  DAILY PRN   





  HYGEINE   


 


Hydroxyurea  1,500 mg 05/01/17 22:00 05/03/17 11:09





  Hydrea -  PO   Not Given





  BID NASRIN   


 


Levothyroxine Sodium  88 mcg 04/27/17 07:00 05/03/17 07:16





  Synthroid -  PO   88 mcg





  DAILY@0700 NASRIN   Administration


 


Nebivolol  5 mg 04/27/17 10:00 05/03/17 11:00





  Bystolic -  PO   5 mg





  DAILY NASRIN   Administration


 


Pantoprazole Sodium  40 mg 04/27/17 10:00 05/03/17 10:59





  Protonix -  PO   40 mg





  DAILY NASRIN   Administration


 


Rosuvastatin Calcium  20 mg 04/26/17 22:00 05/02/17 23:00





  Crestor -  PO   20 mg





  HS NASRIN   Administration











A/P





65 y/o patient with thrombocytosis/purple toe


JAK2+


On hydrea 1500mg bid since WBC reduced will switch to 1000mg bid


on anagrelide 0.5mg QID


On ASA 81 mg bid





disucssed with vascular--microvascular ischemia


continue ASA/Hydrea


monitor





rash --dermatology consult


? erythromelalgia  related to ET --but no pain


erythema over fce/hands/feet





low grade fevers, erythema at iv site -- check cx. ID consult

## 2017-05-03 NOTE — PN
Progress Note, Physician


Chief Complaint: 


has pain in left toe, decreased 


now has pruritic rash on hands and legs , groin





- Current Medication List


Current Medications: 


Active Medications





Amlodipine Besylate (Norvasc -)  5 mg PO DAILY [facility]


   Last Admin: 05/03/17 10:59 Dose:  5 mg


Anagrelide HCl (Agrylin -)  0.5 mg PO QID [facility]


   Last Admin: 05/03/17 11:01 Dose:  0.5 mg


Aspirin (Asa -)  81 mg PO BID [facility]


   Last Admin: 05/03/17 10:59 Dose:  81 mg


Hydroxyurea (Hydrea -)  1,500 mg PO BID [facility]


   Last Admin: 05/03/17 11:09 Dose:  Not Given


Levothyroxine Sodium (Synthroid -)  88 mcg PO DAILY@0700 [facility]


   Last Admin: 05/03/17 07:16 Dose:  88 mcg


Nebivolol (Bystolic -)  5 mg PO DAILY [facility]


   Last Admin: 05/03/17 11:00 Dose:  5 mg


Pantoprazole Sodium (Protonix -)  40 mg PO DAILY [facility]


   Last Admin: 05/03/17 10:59 Dose:  40 mg


Rosuvastatin Calcium (Crestor -)  20 mg PO HS [facility]


   Last Admin: 05/02/17 23:00 Dose:  20 mg











- Objective


Vital Signs: 


 Vital Signs











Temperature  99 F   05/03/17 06:41


 


Pulse Rate  68   05/03/17 06:41


 


Respiratory Rate  20   05/03/17 06:41


 


Blood Pressure  128/69   05/03/17 06:41


 


O2 Sat by Pulse Oximetry (%)  97   05/02/17 21:00











Constitutional: Yes: No Distress


Cardiovascular: Yes: Regular Rate and Rhythm


Respiratory: Yes: CTA Bilaterally


Gastrointestinal: Yes: Normal Bowel Sounds, Soft.  No: Tenderness


Edema: No


Integumentary: Yes: Rash (dorsum of hands and legs)


Labs: 


 CBC, BMP





 05/03/17 06:30 





 05/03/17 06:30 





 INR, PTT











INR  1.10  (0.82-1.09)   04/26/17  10:05    














Problem List





- Problems


(1) Ischemic toe


Code(s): I99.8 - OTHER DISORDER OF CIRCULATORY SYSTEM





(2) Thrombocytosis


Code(s): D47.3 - ESSENTIAL (HEMORRHAGIC) THROMBOCYTHEMIA





(3) Cellulitis


Code(s): L03.90 - CELLULITIS, UNSPECIFIED   Qualifiers: 


     Site of cellulitis of extremity: toe     Laterality: left





(4) HTN (hypertension)


Code(s): I10 - ESSENTIAL (PRIMARY) HYPERTENSION   Qualifiers: 


     Hypertension type: essential hypertension        Qualified Code(s): I10 - 

Essential (primary) hypertension  








Assessment/Plan


PLAN





platelets decreasing


spoke with Dr Paige today


concern for micro-embolisms-- may need Lovenox sc


Dermatology eval for rash-- unsure if its due to thrombocytosis and meds 


continue with meds


Rheumatology eval noted-- pt does not want to do skin biopsy 


pain control


OOB 


arterial doppler normal


Echo - normal

## 2017-05-04 VITALS — SYSTOLIC BLOOD PRESSURE: 122 MMHG | HEART RATE: 90 BPM | TEMPERATURE: 98 F | DIASTOLIC BLOOD PRESSURE: 87 MMHG

## 2017-05-04 LAB
ALBUMIN SERPL-MCNC: 3.7 G/DL (ref 3.4–5)
ALP SERPL-CCNC: 91 U/L (ref 45–117)
ALT SERPL-CCNC: 17 U/L (ref 12–78)
ANION GAP SERPL CALC-SCNC: 8 MMOL/L (ref 8–16)
AST SERPL-CCNC: 14 U/L (ref 15–37)
B2 GLYCOPROT1 IGA SERPL IA-ACNC: <9 (ref 0–25)
BASOPHILS # BLD: 1.4 % (ref 0–2)
BILIRUB DIRECT SERPL-MCNC: 226 U/L (ref 84–246)
BILIRUB SERPL-MCNC: 0.9 MG/DL (ref 0.2–1)
CALCIUM SERPL-MCNC: 9.3 MG/DL (ref 8.5–10.1)
CO2 SERPL-SCNC: 27 MMOL/L (ref 21–32)
COCKROFT - GAULT: 85.54
CREAT SERPL-MCNC: 0.7 MG/DL (ref 0.55–1.02)
DEPRECATED RDW RBC AUTO: 15.2 % (ref 11.6–15.6)
EOSINOPHIL # BLD: 5 % (ref 0–4.5)
GLUCOSE SERPL-MCNC: 95 MG/DL (ref 74–106)
MCH RBC QN AUTO: 27.5 PG (ref 25.7–33.7)
MCHC RBC AUTO-ENTMCNC: 34.7 G/DL (ref 32–36)
MCV RBC: 79.2 FL (ref 80–96)
NEUTROPHILS # BLD: 66.5 % (ref 42.8–82.8)
PLATELET # BLD AUTO: 916 K/MM3 (ref 134–434)
PMV BLD: 8.6 FL (ref 7.5–11.1)
PROT SERPL-MCNC: 6.7 G/DL (ref 6.4–8.2)
URATE SERPL-SCNC: 5 MG/DL (ref 2.6–7.2)
WBC # BLD AUTO: 10.4 K/MM3 (ref 4–10)

## 2017-05-04 RX ADMIN — NEBIVOLOL HYDROCHLORIDE SCH MG: 5 TABLET ORAL at 11:19

## 2017-05-04 RX ADMIN — ANAGRELIDE HYDROCHLORIDE SCH MG: 0.5 CAPSULE ORAL at 11:18

## 2017-05-04 RX ADMIN — HYDROXYUREA SCH MG: 500 CAPSULE ORAL at 11:18

## 2017-05-04 RX ADMIN — PANTOPRAZOLE SODIUM SCH MG: 40 TABLET, DELAYED RELEASE ORAL at 11:19

## 2017-05-04 RX ADMIN — ANAGRELIDE HYDROCHLORIDE SCH MG: 0.5 CAPSULE ORAL at 14:31

## 2017-05-04 RX ADMIN — AMLODIPINE BESYLATE SCH MG: 5 TABLET ORAL at 11:19

## 2017-05-04 RX ADMIN — LEVOTHYROXINE SODIUM SCH MCG: 88 TABLET ORAL at 06:34

## 2017-05-04 RX ADMIN — ASPIRIN 81 MG SCH MG: 81 TABLET ORAL at 11:19

## 2017-05-04 NOTE — CONS
DATE OF CONSULTATION:

 

DATE OF DICTATION:  05/04/2017

 

INFECTIOUS DISEASE CONSULTATION 

 

HISTORY OF PRESENT ILLNESS:  This is a 66-year-old [place] woman who I am asked to

see for low-grade temperature over the last 48 hours.  The patient was originally

admitted on April 26 with what appeared to be a cyanotic left 5th toe.  She has a

history of Raynaud disease for which she had been previously followed at [facility].  She is apparently on no therapy for collagen vascular disease, nor

has she had any recent rheumatology appointments.  She also has thrombocytosis,

thrombocythemia for which she was seen by hematology and is on hydroxyurea.  The

patient was admitted afebrile and had admitting blood cultures which were negative

for both blood as well as urine.  Over the last 48 hours, she has had some low-grade

temperatures only in the absence of any chills or other systemic complaints.  She has

been seen along the way by cardiology as well as by rheumatology, and has been on

aspirin and hydroxyurea.  She is on no antibiotics, as her cultures have been

negative.  She denies any cough, chest pain, abdominal pain, or urinary complaints.  

 

PAST MEDICAL HISTORY:  Includes hypertension, hyperlipidemia, hypothyroidism, surgery

for goiter, chronic low back pain, Raynaud disease, thrombocythemia. 

 

MEDICATIONS AT HOME:  Levothyroxine, Crestor, tramadol, aspirin, hydroxyurea,

nebivolol, omeprazole.  

 

ALLERGIES:  None known.

 

SOCIAL HISTORY:  Former smoker but gave this up many years ago.  .  No recent

travel.  No HIV risk factors.

 

FAMILY HISTORY:  Reviewed and noncontributory.

 

REVIEW OF SYSTEMS: 

Respiratory:  No cough, shortness of breath, chest pain.  

Cardiac:  No palpitations, syncope, murmur.  

Gastrointestinal:  No dysphagia, abdominal pain, bleeding per rectum, weight loss.  

Genitourinary:  No dysuria, hematuria, urinary frequency.  

 

PHYSICAL EXAMINATION:

General:  She was a pleasant woman in no acute distress.  

Vital signs:  Temperature 99.3, pulse 66, blood pressure 133/93, respirations 20.  

Neck:  Supple without adenopathy. 

Lungs:  Clear to percussion and auscultation.  

Heart:  S1, S2.  Regular rhythm without murmur.   

Abdomen:  Soft.  Nontender.  Positive bowel sounds without organomegaly.  

Extremities:  Revealed a prior catheter phlebitis of the right forearm which was

mildly tender to touch but not fluctuant.  There was an erythematous rash noted

mostly on both hands with some swelling of the right hand.  And left purplish 5th

toe.  

 

LABORATORY:  The white count is 10.4 with a hemoglobin of 13, platelets of 916, INR

of 1.10, PTT 30.9, BUN 14, creatinine 0.78.  Liver enzymes within normal limits.  CRP

of 1.4, ESR 10.  Urinalysis not available.  

 

Admitting chest x-ray shows no acute pathology.  

 

ASSESSMENT:  A 66-year-old female with a history of Raynaud's presents with ischemic

left 5th toe in the setting of history of Raynaud's as well as thrombocytosis with an

admitting platelet count of 856,000 to 924,000.  Seen by hematology.  Currently on

aspirin and hydroxyurea, to be continued.  Low grade fever noted but clinically looks

well.  Blood cultures negative to date.  No obvious evidence of infection at this

time.  Case was discussed with Dr. Beata Lauren.  Most likely she will be discharged

to home later today with outpatient followup in rheumatology and hematology.  

 

 

CASEY ANDERSON M.D.

 

[id number]

DD: 05/04/2017 11:10

DT: 05/04/2017 19:15

Job #:  [id number]

## 2017-05-04 NOTE — PN
Progress Note (short form)





- Note


Progress Note: 


PAtient seen and examined





Patient all set to go home . Dressed up and anxious to go.








 Last Vital Signs











Temp Pulse Resp BP Pulse Ox


 


 98.0 F   90   16   122/87   97 


 


 05/04/17 15:33  05/04/17 15:33  05/04/17 15:33  05/04/17 15:33  05/03/17 21:00











 


 








Cor: RSR, No murmurs, No gallops


Lungs: Clear to P&A


Abd: Soft, Normal bowel sounds, No organomegaly


Ext: Lt. foot toe --color  is improved





 


 Abnormal Lab Results











  04/29/17 05/03/17 05/03/17





  07:20 06:30 06:30


 


MCV   78.5 L 


 


Plt Count   907 H 


 


Chloride    108 H


 


Random Glucose    114 H


 


LD Total  242 H  


 


Total Protein    6.0 L


 


Albumin    3.3 L











Active Medications











Generic Name Dose Route Start Last Admin





  Trade Name Freq  PRN Reason Stop Dose Admin


 


Amlodipine Besylate  5 mg 05/01/17 11:45 05/03/17 10:59





  Norvasc -  PO   5 mg





  DAILY [facility]   Administration


 


Anagrelide HCl  0.5 mg 05/02/17 18:00 05/03/17 15:46





  Agrylin -  PO   0.5 mg





  QID [facility]   Administration


 


Aspirin  81 mg 04/28/17 22:00 05/03/17 10:59





  Asa -  PO   81 mg





  BID [facility]   Administration


 


Colloidal Oatmeal  1 applic 05/03/17 11:38  





  Aveeno Soap -  TP   





  DAILY PRN   





  HYGEINE   


 


Hydroxyurea  1,500 mg 05/01/17 22:00 05/03/17 11:09





  Hydrea -  PO   Not Given





  BID [facility]   


 


Levothyroxine Sodium  88 mcg 04/27/17 07:00 05/03/17 07:16





  Synthroid -  PO   88 mcg





  DAILY@0700 [facility]   Administration


 


Nebivolol  5 mg 04/27/17 10:00 05/03/17 11:00





  Bystolic -  PO   5 mg





  DAILY [facility]   Administration


 


Pantoprazole Sodium  40 mg 04/27/17 10:00 05/03/17 10:59





  Protonix -  PO   40 mg





  DAILY [facility]   Administration


 


Rosuvastatin Calcium  20 mg 04/26/17 22:00 05/02/17 23:00





  Crestor -  PO   20 mg





  HS [facility]   Administration











A/P





65 y/o patient with thrombocytosis/purple toe


JAK2+


On hydrea 1000mg bid


on anagrelide 0.5mg QID--change to bid


On ASA 81 mg bid





discussed at length with patient and daughter


disucssed the concerns of low blood counts, sepsis , worseing ischemia, 

thrombotic/bleeding events


they understand the complications of the disease process


They also realize the need for close f/u --- to f/u on Monday for CBC check


to follow up with vascular team--discussed with Dr. Bishop


Contact nos. given

## 2017-05-04 NOTE — DS
Physical Examination


Vital Signs: 


 Vital Signs











Temperature  99.3 F   05/04/17 06:31


 


Pulse Rate  66   05/04/17 06:31


 


Respiratory Rate  20   05/04/17 06:31


 


Blood Pressure  133/93   05/04/17 06:31


 


O2 Sat by Pulse Oximetry (%)  97   05/03/17 21:00











Constitutional: Yes: No Distress


Cardiovascular: Yes: Regular Rate and Rhythm


Respiratory: Yes: CTA Bilaterally


Gastrointestinal: Yes: Normal Bowel Sounds, Soft.  No: Tenderness


Extremities: Yes: Other (left 5th toe-- bluish)


Edema: No


Labs: 


 CBC, BMP





 05/04/17 06:10 





 05/04/17 06:10 











Discharge Summary


Reason For Visit: ISCHEMIA OF TOE


Current Active Problems





Cellulitis (Acute) 


Fever (Acute) 


HTN (hypertension) (Acute) 


Ischemic toe (Acute) 


Thrombocytosis (Acute) 








Hospital Course: 


admission 





Pt examined by me in the floors


Family at bedside


Has h/o Thrombocythemia-- seen by Dr sutherland on 4/25 for blue left 5 th toe and 

was advised to go to ER at that time, but refused to go. 


She states that the bluish color has gotten better. Has pain at underside of 

toe. Able to move toes. 


She c/o dysphagia for many years now- she feels food stuck in her chest about 

20 min after eating food. She had EGD done before which was normal per pt. 


She is able to ambulate without any difficulty 








Hospitalization course--





She was seen by Vascular-- no gangrene


Hematology-- had microcalcifications - on Hydrea , Agrilide and ASA- dose 

adjusted


Seen by ID today for low grade fever-- blood cultures so far negative per ID-- 

I spoke with him-- no need for antibiotics. 


Seen by Rheumatology-- suggested skin biospy-- pt did not want to have that done


Pt's toe looks btter


Arterial doppler- negative


Seen by Cardiology to r/o Embolic events-- Echo normal. Advised stress test as 

an outpt. 


Pt 's pain decreased


Stable for dc home 





Condition: Stable





- Instructions


Referrals: 


Pranav Sutherland MD [Primary Care Provider] - 


Disposition: HOME





- Home Medications


Comprehensive Discharge Medication List: 


Ambulatory Orders





Levothyroxine [Synthroid -] 88 mcg PO DAILY 04/13/15 


Rosuvastatin Calcium [Crestor] 20 mg PO HS 04/13/15 


Tramadol HCl [Ultram -] 50 mg PO PRN PRN 04/13/15 


Aspirin [ASA -] 81 mg PO DAILY 04/26/17 


Hydroxyurea [Hydrea] 500 mg PO BID 04/26/17 


Nebivolol [Bystolic -] 5 mg PO DAILY 04/26/17 


Omeprazole 40 mg PO DAILY 04/26/17

## 2017-05-04 NOTE — PN
Progress Note, Physician


Chief Complaint: 


ID








Full note dictated








Had some low grade fever but essentially feels well and wants to go home





Currently afebrile 





- Current Medication List


Current Medications: 


Active Medications





Amlodipine Besylate (Norvasc -)  5 mg PO DAILY [facility]


   Last Admin: 05/03/17 10:59 Dose:  5 mg


Anagrelide HCl (Agrylin -)  0.5 mg PO QID [facility]


   Last Admin: 05/03/17 21:32 Dose:  0.5 mg


Aspirin (Asa -)  81 mg PO BID [facility]


   Last Admin: 05/03/17 21:31 Dose:  81 mg


Colloidal Oatmeal (Aveeno Soap -)  1 applic TP DAILY [facility]


   PRN Reason: HYGEINE


Hydroxyurea (Hydrea -)  1,000 mg PO BID [facility]


   Last Admin: 05/03/17 21:31 Dose:  1,000 mg


Levothyroxine Sodium (Synthroid -)  88 mcg PO DAILY@0700 [facility]


   Last Admin: 05/04/17 06:34 Dose:  88 mcg


Nebivolol (Bystolic -)  5 mg PO DAILY [facility]


   Last Admin: 05/03/17 11:00 Dose:  5 mg


Pantoprazole Sodium (Protonix -)  40 mg PO DAILY [facility]


   Last Admin: 05/03/17 10:59 Dose:  40 mg


Rosuvastatin Calcium (Crestor -)  20 mg PO HS [facility]


   Last Admin: 05/03/17 21:31 Dose:  20 mg











- Objective


Vital Signs: 


 Vital Signs











Temperature  99.3 F   05/04/17 06:31


 


Pulse Rate  66   05/04/17 06:31


 


Respiratory Rate  20   05/04/17 06:31


 


Blood Pressure  133/93   05/04/17 06:31


 


O2 Sat by Pulse Oximetry (%)  97   05/03/17 21:00











Constitutional: Yes: No Distress


Neck: Yes: WNL, Supple


Cardiovascular: Yes: Regular Rate and Rhythm, S1, S2.  No: Murmur


Respiratory: Yes: WNL, Regular, CTA Bilaterally


Gastrointestinal: Yes: WNL, Normal Bowel Sounds, Soft.  No: Tenderness, 

Tenderness, Epigastrium


Extremities: Yes: Other (right forearm iv site phlebitis mild tender 

Erythematous rash hands Swelling of right hand %th toe cyanotic)


Labs: 


 CBC, BMP





 05/04/17 06:10 





 05/04/17 06:10 





 INR, PTT











INR  1.10  (0.82-1.09)   04/26/17  10:05    














Problem List





- Problems


(1) Ischemic toe


Code(s): I99.8 - OTHER DISORDER OF CIRCULATORY SYSTEM





(2) Fever


Code(s): R50.9 - FEVER, UNSPECIFIED   








Assessment/Plan


Microbiology





04/30/17 10:15   Urine - Urine Clean Catch   Urine Culture - Final


                              NO GROWTH OBTAINED


04/27/17 10:25   Blood - Peripheral Venous   Blood Culture - Final


                              NO GROWTH AFTER 5 DAYS INCUBATION


04/27/17 10:20   Blood - Peripheral Venous   Blood Culture - Final


                              NO GROWTH AFTER 5 DAYS INCUBATION





 Laboratory Tests











  05/04/17 05/04/17





  06:10 06:10


 


WBC  10.4 H 


 


Hgb  13.0 


 


Plt Count  916 H 


 


BUN   14


 


Creatinine   0.7


 


Creat Clearance w eGFR   > 60








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


                        Low grade temp but normal WBC and looks fine Has 

phlebitis of the forearm IV cath


                       related





Plan


Blood cultures neg today so far and neg previously


No antibiotics


Consider discharge with out pt f/u


Discussed with Beata Dennison MD

## 2017-05-07 LAB — FACT VIII ACT/NOR PPP: 72 % (ref 57–163)

## 2018-04-30 ENCOUNTER — HOSPITAL ENCOUNTER (OUTPATIENT)
Dept: HOSPITAL 74 - JASU-ENDO | Age: 68
Discharge: HOME | End: 2018-04-30
Attending: INTERNAL MEDICINE
Payer: COMMERCIAL

## 2018-04-30 VITALS — TEMPERATURE: 98.4 F

## 2018-04-30 VITALS — BODY MASS INDEX: 26.4 KG/M2

## 2018-04-30 VITALS — DIASTOLIC BLOOD PRESSURE: 52 MMHG | SYSTOLIC BLOOD PRESSURE: 112 MMHG

## 2018-04-30 VITALS — HEART RATE: 55 BPM

## 2018-04-30 DIAGNOSIS — K64.8: ICD-10-CM

## 2018-04-30 DIAGNOSIS — K57.30: ICD-10-CM

## 2018-04-30 DIAGNOSIS — D12.3: ICD-10-CM

## 2018-04-30 DIAGNOSIS — Z86.010: Primary | ICD-10-CM

## 2018-04-30 PROCEDURE — 0DBL8ZX EXCISION OF TRANSVERSE COLON, VIA NATURAL OR ARTIFICIAL OPENING ENDOSCOPIC, DIAGNOSTIC: ICD-10-PCS | Performed by: INTERNAL MEDICINE

## 2018-05-01 NOTE — PATH
Surgical Pathology Report



Patient Name:  GAL ROSE

Accession #:  [id number]

[facility]. Rec. #:  [id number]                                                        

   /Age/Gender:  1950 (Age: 67) / F

Account:  [id number]                                                          

             Location: [facility]-ENDOSCOPY

Taken:  2018

Received:  2018

Reported:  2018

Physicians:  Korin Solorzano M.D.

  



Specimen(s) Received

 BIOPSY TRANSVERSE COLON POLYPS 





Clinical History

He Adenoma surveillance

Postoperative diagnosis: Diverticulosis, polyps







Final Diagnosis

TRANSVERSE COLON, POLYPS, BIOPSY:

HYPERPLASTIC POLYP.

SEPARATE FRAGMENT OF COLONIC MUCOSA WITH SMALL LYMPHOID AGGREGATE.





***Electronically Signed***

Aviva Park M.D.





Gross Description

Received in formalin, labeled "biopsy transverse colon polyps" are 8 tan,

irregular portions of soft tissue ranging from 0.1-0.4 cm. in greatest

dimension. The specimens are submitted in toto in one cassette.

/2018



saudi

## 2019-10-04 NOTE — EKG
Test Reason : 

Blood Pressure : ***/*** mmHG

Vent. Rate : 068 BPM     Atrial Rate : 068 BPM

   P-R Int : 132 ms          QRS Dur : 080 ms

    QT Int : 394 ms       P-R-T Axes : 047 068 090 degrees

   QTc Int : 418 ms

 

NORMAL SINUS RHYTHM

NONSPECIFIC T WAVE ABNORMALITY

ABNORMAL ECG

NO PREVIOUS ECGS AVAILABLE

Confirmed by VIJAYA MCNAMARA, FALGUNI ([id number]) on 4/26/2017 11:51:31 AM

 

Referred By:             Confirmed By:FALGUNI LILLY MD DISPLAY PLAN FREE TEXT

## 2022-02-11 ENCOUNTER — HOSPITAL ENCOUNTER (EMERGENCY)
Dept: HOSPITAL 74 - JERFT | Age: 72
Discharge: HOME | End: 2022-02-11
Payer: COMMERCIAL

## 2022-02-11 VITALS — BODY MASS INDEX: 25.7 KG/M2

## 2022-02-11 VITALS — TEMPERATURE: 98.5 F | DIASTOLIC BLOOD PRESSURE: 63 MMHG | SYSTOLIC BLOOD PRESSURE: 145 MMHG | HEART RATE: 77 BPM

## 2022-02-11 DIAGNOSIS — H10.33: Primary | ICD-10-CM

## 2022-02-11 PROCEDURE — U0005 INFEC AGEN DETEC AMPLI PROBE: HCPCS

## 2022-02-11 PROCEDURE — U0003 INFECTIOUS AGENT DETECTION BY NUCLEIC ACID (DNA OR RNA); SEVERE ACUTE RESPIRATORY SYNDROME CORONAVIRUS 2 (SARS-COV-2) (CORONAVIRUS DISEASE [COVID-19]), AMPLIFIED PROBE TECHNIQUE, MAKING USE OF HIGH THROUGHPUT TECHNOLOGIES AS DESCRIBED BY CMS-2020-01-R: HCPCS

## 2022-02-11 PROCEDURE — C9803 HOPD COVID-19 SPEC COLLECT: HCPCS

## 2022-03-08 ENCOUNTER — HOSPITAL ENCOUNTER (OUTPATIENT)
Dept: HOSPITAL 74 - JER | Age: 72
Setting detail: OBSERVATION
LOS: 2 days | Discharge: HOME | End: 2022-03-10
Attending: INTERNAL MEDICINE | Admitting: INTERNAL MEDICINE
Payer: COMMERCIAL

## 2022-03-08 VITALS — BODY MASS INDEX: 24.5 KG/M2

## 2022-03-08 DIAGNOSIS — R10.12: ICD-10-CM

## 2022-03-08 DIAGNOSIS — Z90.89: ICD-10-CM

## 2022-03-08 DIAGNOSIS — Z87.891: ICD-10-CM

## 2022-03-08 DIAGNOSIS — R11.2: ICD-10-CM

## 2022-03-08 DIAGNOSIS — K21.9: ICD-10-CM

## 2022-03-08 DIAGNOSIS — Z88.8: ICD-10-CM

## 2022-03-08 DIAGNOSIS — I73.00: ICD-10-CM

## 2022-03-08 DIAGNOSIS — K56.609: ICD-10-CM

## 2022-03-08 DIAGNOSIS — M34.9: ICD-10-CM

## 2022-03-08 DIAGNOSIS — R10.13: ICD-10-CM

## 2022-03-08 DIAGNOSIS — E07.9: ICD-10-CM

## 2022-03-08 DIAGNOSIS — I10: ICD-10-CM

## 2022-03-08 DIAGNOSIS — E78.5: ICD-10-CM

## 2022-03-08 DIAGNOSIS — E04.9: ICD-10-CM

## 2022-03-08 DIAGNOSIS — Z89.422: ICD-10-CM

## 2022-03-08 DIAGNOSIS — D75.839: ICD-10-CM

## 2022-03-08 DIAGNOSIS — K56.7: Primary | ICD-10-CM

## 2022-03-08 LAB
ALBUMIN SERPL-MCNC: 4.2 G/DL (ref 3.4–5)
ALP SERPL-CCNC: 123 U/L (ref 45–117)
ALT SERPL-CCNC: 22 U/L (ref 13–61)
ANION GAP SERPL CALC-SCNC: 4 MMOL/L (ref 8–16)
ANION GAP SERPL CALC-SCNC: 6 MMOL/L (ref 8–16)
APPEARANCE UR: CLEAR
AST SERPL-CCNC: 29 U/L (ref 15–37)
BACTERIA # UR AUTO: 319 /UL (ref 0–1359)
BASOPHILS # BLD: 0.6 % (ref 0–2)
BILIRUB CONJ SERPL-MCNC: 0.2 MG/DL (ref 0–0.2)
BILIRUB SERPL-MCNC: 1.1 MG/DL (ref 0.2–1)
BILIRUB UR STRIP.AUTO-MCNC: NEGATIVE MG/DL
BUN SERPL-MCNC: 10.5 MG/DL (ref 7–18)
BUN SERPL-MCNC: 8.1 MG/DL (ref 7–18)
CALCIUM SERPL-MCNC: 10 MG/DL (ref 8.5–10.1)
CALCIUM SERPL-MCNC: 10.4 MG/DL (ref 8.5–10.1)
CASTS URNS QL MICRO: 0 /UL (ref 0–3.1)
CHLORIDE SERPL-SCNC: 104 MMOL/L (ref 98–107)
CHLORIDE SERPL-SCNC: 106 MMOL/L (ref 98–107)
CO2 SERPL-SCNC: 30 MMOL/L (ref 21–32)
CO2 SERPL-SCNC: 31 MMOL/L (ref 21–32)
COLOR UR: YELLOW
CREAT SERPL-MCNC: 0.7 MG/DL (ref 0.55–1.3)
CREAT SERPL-MCNC: 0.8 MG/DL (ref 0.55–1.3)
DEPRECATED RDW RBC AUTO: 15 % (ref 11.6–15.6)
EOSINOPHIL # BLD: 0.1 % (ref 0–4.5)
EPITH CASTS URNS QL MICRO: 11 /UL (ref 0–25.1)
GLUCOSE SERPL-MCNC: 121 MG/DL (ref 74–106)
GLUCOSE SERPL-MCNC: 143 MG/DL (ref 74–106)
HCT VFR BLD CALC: 38.6 % (ref 32.4–45.2)
HGB BLD-MCNC: 13 GM/DL (ref 10.7–15.3)
KETONES UR QL STRIP: (no result)
LEUKOCYTE ESTERASE UR QL STRIP.AUTO: NEGATIVE
LIPASE SERPL-CCNC: 52 U/L (ref 73–393)
LYMPHOCYTES # BLD: 5.9 % (ref 8–40)
MCH RBC QN AUTO: 32 PG (ref 25.7–33.7)
MCHC RBC AUTO-ENTMCNC: 33.6 G/DL (ref 32–36)
MCV RBC: 95 FL (ref 80–96)
MONOCYTES # BLD AUTO: 4 % (ref 3.8–10.2)
NEUTROPHILS # BLD: 89.4 % (ref 42.8–82.8)
NITRITE UR QL STRIP: NEGATIVE
PH UR: 7 [PH] (ref 5–8)
PLATELET # BLD AUTO: 667 10^3/UL (ref 134–434)
PMV BLD: 9.3 FL (ref 7.5–11.1)
PROT SERPL-MCNC: 7.7 G/DL (ref 6.4–8.2)
PROT UR QL STRIP: (no result)
PROT UR QL STRIP: NEGATIVE
RBC # BLD AUTO: 3 /UL (ref 0–23.9)
RBC # BLD AUTO: 4.06 M/MM3 (ref 3.6–5.2)
SODIUM SERPL-SCNC: 140 MMOL/L (ref 136–145)
SODIUM SERPL-SCNC: 141 MMOL/L (ref 136–145)
SP GR UR: 1.01 (ref 1.01–1.03)
UROBILINOGEN UR STRIP-MCNC: 0.2 MG/DL (ref 0.2–1)
WBC # BLD AUTO: 14.4 K/MM3 (ref 4–10)
WBC # UR AUTO: 3 /UL (ref 0–25.8)

## 2022-03-08 PROCEDURE — U0003 INFECTIOUS AGENT DETECTION BY NUCLEIC ACID (DNA OR RNA); SEVERE ACUTE RESPIRATORY SYNDROME CORONAVIRUS 2 (SARS-COV-2) (CORONAVIRUS DISEASE [COVID-19]), AMPLIFIED PROBE TECHNIQUE, MAKING USE OF HIGH THROUGHPUT TECHNOLOGIES AS DESCRIBED BY CMS-2020-01-R: HCPCS

## 2022-03-08 PROCEDURE — G0378 HOSPITAL OBSERVATION PER HR: HCPCS

## 2022-03-08 PROCEDURE — 3E033NZ INTRODUCTION OF ANALGESICS, HYPNOTICS, SEDATIVES INTO PERIPHERAL VEIN, PERCUTANEOUS APPROACH: ICD-10-PCS | Performed by: INTERNAL MEDICINE

## 2022-03-08 PROCEDURE — 3E033GC INTRODUCTION OF OTHER THERAPEUTIC SUBSTANCE INTO PERIPHERAL VEIN, PERCUTANEOUS APPROACH: ICD-10-PCS | Performed by: INTERNAL MEDICINE

## 2022-03-08 PROCEDURE — 3E0337Z INTRODUCTION OF ELECTROLYTIC AND WATER BALANCE SUBSTANCE INTO PERIPHERAL VEIN, PERCUTANEOUS APPROACH: ICD-10-PCS | Performed by: INTERNAL MEDICINE

## 2022-03-08 PROCEDURE — C9803 HOPD COVID-19 SPEC COLLECT: HCPCS

## 2022-03-08 PROCEDURE — U0005 INFEC AGEN DETEC AMPLI PROBE: HCPCS

## 2022-03-08 PROCEDURE — 3E023GC INTRODUCTION OF OTHER THERAPEUTIC SUBSTANCE INTO MUSCLE, PERCUTANEOUS APPROACH: ICD-10-PCS | Performed by: INTERNAL MEDICINE

## 2022-03-08 RX ADMIN — SODIUM CHLORIDE SCH MLS/HR: 9 INJECTION, SOLUTION INTRAVENOUS at 18:11

## 2022-03-09 LAB
ALBUMIN SERPL-MCNC: 3.4 G/DL (ref 3.4–5)
ALP SERPL-CCNC: 91 U/L (ref 45–117)
ALT SERPL-CCNC: 16 U/L (ref 13–61)
ANION GAP SERPL CALC-SCNC: 4 MMOL/L (ref 8–16)
AST SERPL-CCNC: 14 U/L (ref 15–37)
BASOPHILS # BLD: 1 % (ref 0–2)
BILIRUB SERPL-MCNC: 1 MG/DL (ref 0.2–1)
BUN SERPL-MCNC: 7 MG/DL (ref 7–18)
CALCIUM SERPL-MCNC: 9.9 MG/DL (ref 8.5–10.1)
CHLORIDE SERPL-SCNC: 111 MMOL/L (ref 98–107)
CO2 SERPL-SCNC: 29 MMOL/L (ref 21–32)
CREAT SERPL-MCNC: 0.7 MG/DL (ref 0.55–1.3)
DEPRECATED RDW RBC AUTO: 14.8 % (ref 11.6–15.6)
EOSINOPHIL # BLD: 1.8 % (ref 0–4.5)
GLUCOSE SERPL-MCNC: 80 MG/DL (ref 74–106)
HCT VFR BLD CALC: 33.1 % (ref 32.4–45.2)
HGB BLD-MCNC: 11.4 GM/DL (ref 10.7–15.3)
LYMPHOCYTES # BLD: 24.8 % (ref 8–40)
MCH RBC QN AUTO: 32.4 PG (ref 25.7–33.7)
MCHC RBC AUTO-ENTMCNC: 34.3 G/DL (ref 32–36)
MCV RBC: 94.5 FL (ref 80–96)
MONOCYTES # BLD AUTO: 9.7 % (ref 3.8–10.2)
NEUTROPHILS # BLD: 62.7 % (ref 42.8–82.8)
PLATELET # BLD AUTO: 559 10^3/UL (ref 134–434)
PMV BLD: 8.8 FL (ref 7.5–11.1)
PROT SERPL-MCNC: 6 G/DL (ref 6.4–8.2)
RBC # BLD AUTO: 3.51 M/MM3 (ref 3.6–5.2)
SODIUM SERPL-SCNC: 144 MMOL/L (ref 136–145)
WBC # BLD AUTO: 7.8 K/MM3 (ref 4–10)

## 2022-03-09 RX ADMIN — ENOXAPARIN SODIUM SCH MG: 40 INJECTION SUBCUTANEOUS at 09:38

## 2022-03-09 RX ADMIN — SODIUM CHLORIDE SCH MLS/HR: 9 INJECTION, SOLUTION INTRAVENOUS at 06:33

## 2022-03-09 RX ADMIN — NEBIVOLOL HYDROCHLORIDE SCH MG: 5 TABLET ORAL at 09:38

## 2022-03-09 RX ADMIN — POLYETHYLENE GLYCOL 3350 SCH GM: 17 POWDER, FOR SOLUTION ORAL at 09:38

## 2022-03-09 RX ADMIN — HYDROXYUREA SCH MG: 500 CAPSULE ORAL at 09:38

## 2022-03-09 RX ADMIN — PANTOPRAZOLE SODIUM SCH MG: 40 INJECTION, POWDER, FOR SOLUTION INTRAVENOUS at 10:07

## 2022-03-09 RX ADMIN — LEVOTHYROXINE SODIUM SCH MCG: 88 TABLET ORAL at 06:04

## 2022-03-10 VITALS — HEART RATE: 60 BPM | TEMPERATURE: 98.6 F | SYSTOLIC BLOOD PRESSURE: 125 MMHG | DIASTOLIC BLOOD PRESSURE: 48 MMHG

## 2022-03-10 RX ADMIN — NEBIVOLOL HYDROCHLORIDE SCH MG: 5 TABLET ORAL at 10:25

## 2022-03-10 RX ADMIN — ENOXAPARIN SODIUM SCH MG: 40 INJECTION SUBCUTANEOUS at 10:24

## 2022-03-10 RX ADMIN — LEVOTHYROXINE SODIUM SCH MCG: 88 TABLET ORAL at 06:00

## 2022-03-10 RX ADMIN — HYDROXYUREA SCH MG: 500 CAPSULE ORAL at 10:25

## 2022-03-10 RX ADMIN — PANTOPRAZOLE SODIUM SCH MG: 40 INJECTION, POWDER, FOR SOLUTION INTRAVENOUS at 10:25

## 2022-03-10 RX ADMIN — POLYETHYLENE GLYCOL 3350 SCH GM: 17 POWDER, FOR SOLUTION ORAL at 10:24

## 2022-09-19 ENCOUNTER — HOSPITAL ENCOUNTER (OUTPATIENT)
Dept: HOSPITAL 74 - JASU-ENDO | Age: 72
Discharge: HOME | End: 2022-09-19
Attending: INTERNAL MEDICINE
Payer: COMMERCIAL

## 2022-09-19 VITALS — BODY MASS INDEX: 25 KG/M2

## 2022-09-19 VITALS
TEMPERATURE: 97 F | HEART RATE: 68 BPM | SYSTOLIC BLOOD PRESSURE: 133 MMHG | RESPIRATION RATE: 15 BRPM | DIASTOLIC BLOOD PRESSURE: 72 MMHG

## 2022-09-19 DIAGNOSIS — K57.30: ICD-10-CM

## 2022-09-19 DIAGNOSIS — K64.8: ICD-10-CM

## 2022-09-19 DIAGNOSIS — Z12.11: Primary | ICD-10-CM

## 2022-09-19 DIAGNOSIS — B96.81: ICD-10-CM

## 2022-09-19 DIAGNOSIS — K29.50: ICD-10-CM

## 2022-09-19 DIAGNOSIS — Z86.010: ICD-10-CM

## 2022-09-19 PROCEDURE — 0DBA8ZX EXCISION OF JEJUNUM, VIA NATURAL OR ARTIFICIAL OPENING ENDOSCOPIC, DIAGNOSTIC: ICD-10-PCS | Performed by: INTERNAL MEDICINE

## 2022-09-19 PROCEDURE — 0DB78ZX EXCISION OF STOMACH, PYLORUS, VIA NATURAL OR ARTIFICIAL OPENING ENDOSCOPIC, DIAGNOSTIC: ICD-10-PCS | Performed by: INTERNAL MEDICINE

## 2022-09-19 PROCEDURE — 0DBL8ZX EXCISION OF TRANSVERSE COLON, VIA NATURAL OR ARTIFICIAL OPENING ENDOSCOPIC, DIAGNOSTIC: ICD-10-PCS | Performed by: INTERNAL MEDICINE

## 2022-09-19 PROCEDURE — 0DBK8ZX EXCISION OF ASCENDING COLON, VIA NATURAL OR ARTIFICIAL OPENING ENDOSCOPIC, DIAGNOSTIC: ICD-10-PCS | Performed by: INTERNAL MEDICINE

## 2022-09-19 PROCEDURE — 0DB98ZX EXCISION OF DUODENUM, VIA NATURAL OR ARTIFICIAL OPENING ENDOSCOPIC, DIAGNOSTIC: ICD-10-PCS | Performed by: INTERNAL MEDICINE
